# Patient Record
Sex: MALE | Race: WHITE | NOT HISPANIC OR LATINO | URBAN - METROPOLITAN AREA
[De-identification: names, ages, dates, MRNs, and addresses within clinical notes are randomized per-mention and may not be internally consistent; named-entity substitution may affect disease eponyms.]

---

## 2018-04-20 ENCOUNTER — EMERGENCY (EMERGENCY)
Facility: HOSPITAL | Age: 73
LOS: 1 days | Discharge: HOME | End: 2018-04-20
Attending: EMERGENCY MEDICINE
Payer: MEDICARE

## 2018-04-20 VITALS
OXYGEN SATURATION: 98 % | HEART RATE: 68 BPM | RESPIRATION RATE: 16 BRPM | DIASTOLIC BLOOD PRESSURE: 93 MMHG | SYSTOLIC BLOOD PRESSURE: 143 MMHG

## 2018-04-20 VITALS
HEART RATE: 73 BPM | SYSTOLIC BLOOD PRESSURE: 153 MMHG | DIASTOLIC BLOOD PRESSURE: 92 MMHG | RESPIRATION RATE: 18 BRPM | WEIGHT: 205.91 LBS | OXYGEN SATURATION: 97 % | TEMPERATURE: 98 F

## 2018-04-20 LAB
ALBUMIN SERPL ELPH-MCNC: 4.5 G/DL — SIGNIFICANT CHANGE UP (ref 3.3–5)
ALP SERPL-CCNC: 90 U/L — SIGNIFICANT CHANGE UP (ref 40–120)
ALT FLD-CCNC: 21 U/L — SIGNIFICANT CHANGE UP (ref 10–45)
ANION GAP SERPL CALC-SCNC: 17 MMOL/L — SIGNIFICANT CHANGE UP (ref 5–17)
AST SERPL-CCNC: 33 U/L — SIGNIFICANT CHANGE UP (ref 10–40)
BASOPHILS # BLD AUTO: 0.1 K/UL — SIGNIFICANT CHANGE UP (ref 0–0.2)
BASOPHILS NFR BLD AUTO: 1 % — SIGNIFICANT CHANGE UP (ref 0–2)
BILIRUB SERPL-MCNC: 0.2 MG/DL — SIGNIFICANT CHANGE UP (ref 0.2–1.2)
BUN SERPL-MCNC: 13 MG/DL — SIGNIFICANT CHANGE UP (ref 7–23)
CALCIUM SERPL-MCNC: 10 MG/DL — SIGNIFICANT CHANGE UP (ref 8.4–10.5)
CHLORIDE SERPL-SCNC: 97 MMOL/L — SIGNIFICANT CHANGE UP (ref 96–108)
CO2 SERPL-SCNC: 25 MMOL/L — SIGNIFICANT CHANGE UP (ref 22–31)
CREAT SERPL-MCNC: 0.82 MG/DL — SIGNIFICANT CHANGE UP (ref 0.5–1.3)
EOSINOPHIL # BLD AUTO: 0.4 K/UL — SIGNIFICANT CHANGE UP (ref 0–0.5)
EOSINOPHIL NFR BLD AUTO: 4.9 % — SIGNIFICANT CHANGE UP (ref 0–6)
GLUCOSE SERPL-MCNC: 102 MG/DL — HIGH (ref 70–99)
HCT VFR BLD CALC: 41.7 % — SIGNIFICANT CHANGE UP (ref 39–50)
HGB BLD-MCNC: 14 G/DL — SIGNIFICANT CHANGE UP (ref 13–17)
LYMPHOCYTES # BLD AUTO: 2 K/UL — SIGNIFICANT CHANGE UP (ref 1–3.3)
LYMPHOCYTES # BLD AUTO: 23.1 % — SIGNIFICANT CHANGE UP (ref 13–44)
MCHC RBC-ENTMCNC: 30.8 PG — SIGNIFICANT CHANGE UP (ref 27–34)
MCHC RBC-ENTMCNC: 33.5 GM/DL — SIGNIFICANT CHANGE UP (ref 32–36)
MCV RBC AUTO: 91.8 FL — SIGNIFICANT CHANGE UP (ref 80–100)
MONOCYTES # BLD AUTO: 0.7 K/UL — SIGNIFICANT CHANGE UP (ref 0–0.9)
MONOCYTES NFR BLD AUTO: 7.6 % — SIGNIFICANT CHANGE UP (ref 2–14)
NEUTROPHILS # BLD AUTO: 5.4 K/UL — SIGNIFICANT CHANGE UP (ref 1.8–7.4)
NEUTROPHILS NFR BLD AUTO: 63.3 % — SIGNIFICANT CHANGE UP (ref 43–77)
PLATELET # BLD AUTO: 332 K/UL — SIGNIFICANT CHANGE UP (ref 150–400)
POTASSIUM SERPL-MCNC: 3.7 MMOL/L — SIGNIFICANT CHANGE UP (ref 3.5–5.3)
POTASSIUM SERPL-SCNC: 3.7 MMOL/L — SIGNIFICANT CHANGE UP (ref 3.5–5.3)
PROT SERPL-MCNC: 8.1 G/DL — SIGNIFICANT CHANGE UP (ref 6–8.3)
RBC # BLD: 4.54 M/UL — SIGNIFICANT CHANGE UP (ref 4.2–5.8)
RBC # FLD: 12.5 % — SIGNIFICANT CHANGE UP (ref 10.3–14.5)
SODIUM SERPL-SCNC: 139 MMOL/L — SIGNIFICANT CHANGE UP (ref 135–145)
WBC # BLD: 8.6 K/UL — SIGNIFICANT CHANGE UP (ref 3.8–10.5)
WBC # FLD AUTO: 8.6 K/UL — SIGNIFICANT CHANGE UP (ref 3.8–10.5)

## 2018-04-20 PROCEDURE — 73701 CT LOWER EXTREMITY W/DYE: CPT

## 2018-04-20 PROCEDURE — 73590 X-RAY EXAM OF LOWER LEG: CPT

## 2018-04-20 PROCEDURE — 73701 CT LOWER EXTREMITY W/DYE: CPT | Mod: 26,RT

## 2018-04-20 PROCEDURE — 80053 COMPREHEN METABOLIC PANEL: CPT

## 2018-04-20 PROCEDURE — 73590 X-RAY EXAM OF LOWER LEG: CPT | Mod: 26,RT

## 2018-04-20 PROCEDURE — 10060 I&D ABSCESS SIMPLE/SINGLE: CPT

## 2018-04-20 PROCEDURE — 99284 EMERGENCY DEPT VISIT MOD MDM: CPT | Mod: 25

## 2018-04-20 PROCEDURE — 93971 EXTREMITY STUDY: CPT | Mod: 26

## 2018-04-20 PROCEDURE — 93971 EXTREMITY STUDY: CPT

## 2018-04-20 PROCEDURE — 85027 COMPLETE CBC AUTOMATED: CPT

## 2018-04-20 NOTE — ED ADULT NURSE REASSESSMENT NOTE - NS ED NURSE REASSESS COMMENT FT1
Care assumed of pt at this time. Wife at bedside. Pt NAD. Updated to plan of care, pt waiting for US results.

## 2018-04-20 NOTE — ED ADULT TRIAGE NOTE - CHIEF COMPLAINT QUOTE
Rt leg swelling/ redness, sent by MD r/o cellulitis.  Recent travel to [place] and [place], denies cough, fever, rash

## 2018-04-20 NOTE — ED PROVIDER NOTE - PLAN OF CARE
You need to follow up with your doctor to make sure that the incision is healing well. Leave the wound open so that it can drain, you can keep it covered with gauze as I showed you. If you have any increase in pain, swelling or redness come right back to the ED. We are sending a different antibiotic to the pharmacy for you to use.

## 2018-04-20 NOTE — ED PROVIDER NOTE - PHYSICAL EXAMINATION
right lower extremity: significant area of erythema to anterior surface of leg, area of induration, no fluctuance, no crepitus, pain to palpation over area of erythema, warm. Normal pedal pulse. Full ROM at ankle, knee. No calf tenderness. Mild/Moderate circumferential calf swelling noted.

## 2018-04-20 NOTE — ED PROVIDER NOTE - CARE PLAN
Principal Discharge DX:	Abscess  Assessment and plan of treatment:	You need to follow up with your doctor to make sure that the incision is healing well. Leave the wound open so that it can drain, you can keep it covered with gauze as I showed you. If you have any increase in pain, swelling or redness come right back to the ED. We are sending a different antibiotic to the pharmacy for you to use. Principal Discharge DX:	Cellulitis and abscess  Assessment and plan of treatment:	You need to follow up with your doctor to make sure that the incision is healing well. Leave the wound open so that it can drain, you can keep it covered with gauze as I showed you. If you have any increase in pain, swelling or redness come right back to the ED. We are sending a different antibiotic to the pharmacy for you to use.

## 2018-04-20 NOTE — ED PROVIDER NOTE - ATTENDING CONTRIBUTION TO CARE
72 y/o male with the above documented history and HPI who on exam appears very well and comfortable. The skin of his anterolateral distal RLE does have an area of erythema with a near central area of induration that is not fluctuant and edema that is however more circumferential than focal with the distal RLE larger than the distal LLE. There is some ttp in the same location as is the erythema without palpable cord or soft tissue crepitus. To me, the area is no more warm than any other location. In the midst of a course of narrow spectrum ABX, we have obtained some basic screening studies in an effort to assess for more serious/complicated issue including an US to r/o DVT. If his screening studies are unremarkable, I anticipate DC on a more broad spectrum ABX. With nothing clinically evident to suggest any more serious infection or complicating issue such as vascular compromise, compartment syndrome, nec fasc, etc.

## 2018-04-20 NOTE — ED PROVIDER NOTE - MEDICAL DECISION MAKING DETAILS
73M with right lower extremity anterior skin changes, swelling, erythema, pain. Concern for cellulitis. Will obtain basic labs, US to r/o DVT, xray to r/o foreign body or free air. Currently stable, no acute distress. Will continue to follow up and re-assess. Case discussed with Attending: Yomaira Valiente MD, PGY1

## 2018-04-20 NOTE — ED ADULT NURSE NOTE - OBJECTIVE STATEMENT
c/o redness, swelling and pain to lower right leg which started about 2 weeks ago when he struck his leg against a bed. Large red and swollen lump noted on right lower leg. Patient recently traveled overseas and states he did a lot of walking while his leg was reddened. Reports he was started on an antibiotic and has been taking it for 5 days now. States his leg was previously much more swollen and red but started improving. States lately the redness and swelling started to become worse again. Patient reports that while he was overseas, a physician drained fluid from the area and informed him there was no pus and only blood. Denies any fever or chills, nausea, vomiting or diarrhea. Reports he is able to ambulate with no difficulty.

## 2018-04-20 NOTE — ED PROVIDER NOTE - OBJECTIVE STATEMENT
73M, hx COPD, presents from PCP office due to swelling and pain of right leg. Per patient, hit leg into a bed frame a few 73M, hx COPD, presents from PCP office due to swelling and pain of right leg. Per patient, hit leg into a bed frame a few weeks ago when on vacation. Patient then began to develop pain and swelling to anterior surface of right lower extremity. While in [place] earlier this week, was seen by doctor who aspirated fluid from area on leg (?) and began patient on lucía antibiotic flucloxacillin. Patient saw PMD today who sent to ED for eval for cellulitis. Patient denies any other symptoms: no fevers or chills, nausea or vomiting, headache, weakness, abdominal pain, numbness in leg. Reports swelling of leg which was worse over last few days but decreased today. Allergic to latex. 73M, hx COPD, presents from PCP office due to swelling and pain of right leg. Per patient, hit leg into a bed frame a few weeks ago when on vacation. Patient then began to develop pain and swelling to anterior surface of right lower extremity. While in [place] earlier this week, was seen by doctor who aspirated fluid from area on leg (?) and began patient on oral antibiotic flucloxacillin. Patient saw PMD today who sent to ED for eval for cellulitis. Patient denies any other symptoms: no fevers or chills, nausea or vomiting, headache, weakness, abdominal pain, numbness in leg. Reports swelling of leg which was worse over last few days but decreased today. Allergic to latex.

## 2018-04-21 RX ORDER — AZTREONAM 2 G
1 VIAL (EA) INJECTION
Qty: 20 | Refills: 0
Start: 2018-04-21 | End: 2018-04-30

## 2018-04-24 NOTE — ED POST DISCHARGE NOTE - OTHER COMMUNICATION
4/24/18 approx 4PM: Patient called inquiring about discharge diagnosis as he was not discharged with paperwork or results. Made pt aware his diagnosis was cellulitis and patient stated he has been taking Bactrim prescribed to him by Dr. Burnette with minimal improvement. Patient continues to have swelling and erythema. Patient on day 4/10 of antibiotics. Discussed with patient to consider returning to ED to be re-evaluated and possibly IV antibiotics since he has not had much improvement. Informed pt if he develops fevers or spreading of cellulitis to return to ED immediately. Patient understood above recommendations and faxed lab results with discharge paperwork to PCP [facility] [phone]-Michelle Del Valle PA-C

## 2018-05-21 ENCOUNTER — EMERGENCY (EMERGENCY)
Facility: HOSPITAL | Age: 73
LOS: 1 days | Discharge: HOME | End: 2018-05-21
Attending: EMERGENCY MEDICINE
Payer: MEDICARE

## 2018-05-21 VITALS
DIASTOLIC BLOOD PRESSURE: 100 MMHG | RESPIRATION RATE: 16 BRPM | TEMPERATURE: 98 F | SYSTOLIC BLOOD PRESSURE: 149 MMHG | WEIGHT: 201.94 LBS | OXYGEN SATURATION: 97 % | HEART RATE: 66 BPM

## 2018-05-21 PROCEDURE — 30901 CONTROL OF NOSEBLEED: CPT

## 2018-05-21 PROCEDURE — 99282 EMERGENCY DEPT VISIT SF MDM: CPT | Mod: 25

## 2018-05-21 PROCEDURE — 30901 CONTROL OF NOSEBLEED: CPT | Mod: LT

## 2018-05-21 PROCEDURE — 99283 EMERGENCY DEPT VISIT LOW MDM: CPT | Mod: 25

## 2018-05-21 RX ORDER — OXYMETAZOLINE HYDROCHLORIDE 0.5 MG/ML
2 SPRAY NASAL ONCE
Qty: 0 | Refills: 0 | Status: COMPLETED | OUTPATIENT
Start: 2018-05-21 | End: 2018-05-21

## 2018-05-21 RX ORDER — TRANEXAMIC ACID 100 MG/ML
1 INJECTION, SOLUTION INTRAVENOUS ONCE
Qty: 0 | Refills: 0 | Status: COMPLETED | OUTPATIENT
Start: 2018-05-21 | End: 2018-05-21

## 2018-05-21 RX ADMIN — OXYMETAZOLINE HYDROCHLORIDE 2 SPRAY(S): 0.5 SPRAY NASAL at 05:00

## 2018-05-21 RX ADMIN — TRANEXAMIC ACID 1 APPLICATION(S): 100 INJECTION, SOLUTION INTRAVENOUS at 05:11

## 2018-05-21 NOTE — ED PROVIDER NOTE - ENMT, MLM
Airway patent, Nasal mucosa clear, small area of mucosal injection over anterior L nasal septum. Mouth with normal mucosa. Throat has no vesicles, no oropharyngeal exudates and uvula is midline. Airway patent, Nasal mucosa clear, small area of mucosal injection over anterior L nasal septum-no active bleeding. Mouth with normal mucosa. Throat has no vesicles, no oropharyngeal exudates and uvula is midline.

## 2018-05-21 NOTE — ED PROVIDER NOTE - OBJECTIVE STATEMENT
72yo M with HTN/hypothyroidism presents with nosebleed. Reports acute onset immediately before arrival. Reports he woke up with nosebleed. Reports recent rhinorrhea and frequent nose blowing. Reports he takes ASA but no other AC. Reports frequent nosebleeds when young.

## 2018-05-21 NOTE — ED PROCEDURE NOTE - CPROC ED NASAL DETAIL1
The source of the bleeding was clearly identified./The area was cauterized with silver nitrate./topical TXA

## 2018-05-21 NOTE — ED PROVIDER NOTE - MEDICAL DECISION MAKING DETAILS
74yo M presents with epistaxis. On exam no active bleeding, will cauterize mucosal defect. Will observe for recurrence.

## 2018-10-19 ENCOUNTER — EMERGENCY (EMERGENCY)
Facility: HOSPITAL | Age: 73
LOS: 1 days | Discharge: HOME | End: 2018-10-19
Attending: EMERGENCY MEDICINE
Payer: MEDICARE

## 2018-10-19 VITALS
HEART RATE: 63 BPM | DIASTOLIC BLOOD PRESSURE: 89 MMHG | SYSTOLIC BLOOD PRESSURE: 147 MMHG | RESPIRATION RATE: 16 BRPM | TEMPERATURE: 98 F | OXYGEN SATURATION: 96 %

## 2018-10-19 VITALS
HEIGHT: 67 IN | HEART RATE: 81 BPM | OXYGEN SATURATION: 97 % | DIASTOLIC BLOOD PRESSURE: 100 MMHG | TEMPERATURE: 98 F | SYSTOLIC BLOOD PRESSURE: 158 MMHG | WEIGHT: 205.03 LBS | RESPIRATION RATE: 20 BRPM

## 2018-10-19 LAB
ALBUMIN SERPL ELPH-MCNC: 4.3 G/DL — SIGNIFICANT CHANGE UP (ref 3.3–5)
ALP SERPL-CCNC: 94 U/L — SIGNIFICANT CHANGE UP (ref 40–120)
ALT FLD-CCNC: 12 U/L — SIGNIFICANT CHANGE UP (ref 10–45)
ANION GAP SERPL CALC-SCNC: 13 MMOL/L — SIGNIFICANT CHANGE UP (ref 5–17)
AST SERPL-CCNC: 17 U/L — SIGNIFICANT CHANGE UP (ref 10–40)
BASE EXCESS BLDV CALC-SCNC: 4.1 MMOL/L — HIGH (ref -2–2)
BASOPHILS # BLD AUTO: 0 K/UL — SIGNIFICANT CHANGE UP (ref 0–0.2)
BASOPHILS NFR BLD AUTO: 0.7 % — SIGNIFICANT CHANGE UP (ref 0–2)
BILIRUB SERPL-MCNC: 0.2 MG/DL — SIGNIFICANT CHANGE UP (ref 0.2–1.2)
BUN SERPL-MCNC: 14 MG/DL — SIGNIFICANT CHANGE UP (ref 7–23)
CA-I SERPL-SCNC: 1.21 MMOL/L — SIGNIFICANT CHANGE UP (ref 1.12–1.3)
CALCIUM SERPL-MCNC: 9.9 MG/DL — SIGNIFICANT CHANGE UP (ref 8.4–10.5)
CHLORIDE BLDV-SCNC: 102 MMOL/L — SIGNIFICANT CHANGE UP (ref 96–108)
CHLORIDE SERPL-SCNC: 100 MMOL/L — SIGNIFICANT CHANGE UP (ref 96–108)
CO2 BLDV-SCNC: 31 MMOL/L — HIGH (ref 22–30)
CO2 SERPL-SCNC: 26 MMOL/L — SIGNIFICANT CHANGE UP (ref 22–31)
CREAT SERPL-MCNC: 0.7 MG/DL — SIGNIFICANT CHANGE UP (ref 0.5–1.3)
D DIMER BLD IA.RAPID-MCNC: 290 NG/ML DDU — HIGH
EOSINOPHIL # BLD AUTO: 0.1 K/UL — SIGNIFICANT CHANGE UP (ref 0–0.5)
EOSINOPHIL NFR BLD AUTO: 1.8 % — SIGNIFICANT CHANGE UP (ref 0–6)
GAS PNL BLDV: 137 MMOL/L — SIGNIFICANT CHANGE UP (ref 136–145)
GAS PNL BLDV: SIGNIFICANT CHANGE UP
GLUCOSE BLDV-MCNC: 90 MG/DL — SIGNIFICANT CHANGE UP (ref 70–99)
GLUCOSE SERPL-MCNC: 96 MG/DL — SIGNIFICANT CHANGE UP (ref 70–99)
HCO3 BLDV-SCNC: 29 MMOL/L — SIGNIFICANT CHANGE UP (ref 21–29)
HCT VFR BLD CALC: 40.9 % — SIGNIFICANT CHANGE UP (ref 39–50)
HCT VFR BLDA CALC: 41 % — SIGNIFICANT CHANGE UP (ref 39–50)
HGB BLD CALC-MCNC: 13.4 G/DL — SIGNIFICANT CHANGE UP (ref 13–17)
HGB BLD-MCNC: 13.1 G/DL — SIGNIFICANT CHANGE UP (ref 13–17)
LACTATE BLDV-MCNC: 1.5 MMOL/L — SIGNIFICANT CHANGE UP (ref 0.7–2)
LYMPHOCYTES # BLD AUTO: 2.3 K/UL — SIGNIFICANT CHANGE UP (ref 1–3.3)
LYMPHOCYTES # BLD AUTO: 34.3 % — SIGNIFICANT CHANGE UP (ref 13–44)
MCHC RBC-ENTMCNC: 25.6 PG — LOW (ref 27–34)
MCHC RBC-ENTMCNC: 32.1 GM/DL — SIGNIFICANT CHANGE UP (ref 32–36)
MCV RBC AUTO: 79.7 FL — LOW (ref 80–100)
MONOCYTES # BLD AUTO: 0.7 K/UL — SIGNIFICANT CHANGE UP (ref 0–0.9)
MONOCYTES NFR BLD AUTO: 10.1 % — SIGNIFICANT CHANGE UP (ref 2–14)
NEUTROPHILS # BLD AUTO: 3.6 K/UL — SIGNIFICANT CHANGE UP (ref 1.8–7.4)
NEUTROPHILS NFR BLD AUTO: 53 % — SIGNIFICANT CHANGE UP (ref 43–77)
NT-PROBNP SERPL-SCNC: 25 PG/ML — SIGNIFICANT CHANGE UP (ref 0–300)
OTHER CELLS CSF MANUAL: 11 ML/DL — LOW (ref 18–22)
PCO2 BLDV: 49 MMHG — SIGNIFICANT CHANGE UP (ref 35–50)
PH BLDV: 7.39 — SIGNIFICANT CHANGE UP (ref 7.35–7.45)
PLATELET # BLD AUTO: 259 K/UL — SIGNIFICANT CHANGE UP (ref 150–400)
PO2 BLDV: 36 MMHG — SIGNIFICANT CHANGE UP (ref 25–45)
POTASSIUM BLDV-SCNC: 3.6 MMOL/L — SIGNIFICANT CHANGE UP (ref 3.5–5.3)
POTASSIUM SERPL-MCNC: 3.8 MMOL/L — SIGNIFICANT CHANGE UP (ref 3.5–5.3)
POTASSIUM SERPL-SCNC: 3.8 MMOL/L — SIGNIFICANT CHANGE UP (ref 3.5–5.3)
PROT SERPL-MCNC: 7.9 G/DL — SIGNIFICANT CHANGE UP (ref 6–8.3)
RBC # BLD: 5.13 M/UL — SIGNIFICANT CHANGE UP (ref 4.2–5.8)
RBC # FLD: 17.2 % — HIGH (ref 10.3–14.5)
SAO2 % BLDV: 62 % — LOW (ref 67–88)
SODIUM SERPL-SCNC: 139 MMOL/L — SIGNIFICANT CHANGE UP (ref 135–145)
TROPONIN T, HIGH SENSITIVITY RESULT: 8 NG/L — SIGNIFICANT CHANGE UP (ref 0–51)
WBC # BLD: 6.8 K/UL — SIGNIFICANT CHANGE UP (ref 3.8–10.5)
WBC # FLD AUTO: 6.8 K/UL — SIGNIFICANT CHANGE UP (ref 3.8–10.5)

## 2018-10-19 PROCEDURE — 83880 ASSAY OF NATRIURETIC PEPTIDE: CPT

## 2018-10-19 PROCEDURE — 84295 ASSAY OF SERUM SODIUM: CPT

## 2018-10-19 PROCEDURE — 82435 ASSAY OF BLOOD CHLORIDE: CPT

## 2018-10-19 PROCEDURE — 93971 EXTREMITY STUDY: CPT | Mod: 26

## 2018-10-19 PROCEDURE — 84484 ASSAY OF TROPONIN QUANT: CPT

## 2018-10-19 PROCEDURE — 85610 PROTHROMBIN TIME: CPT

## 2018-10-19 PROCEDURE — 71275 CT ANGIOGRAPHY CHEST: CPT | Mod: 26

## 2018-10-19 PROCEDURE — 82803 BLOOD GASES ANY COMBINATION: CPT

## 2018-10-19 PROCEDURE — 93308 TTE F-UP OR LMTD: CPT

## 2018-10-19 PROCEDURE — 71275 CT ANGIOGRAPHY CHEST: CPT

## 2018-10-19 PROCEDURE — 93971 EXTREMITY STUDY: CPT

## 2018-10-19 PROCEDURE — 99284 EMERGENCY DEPT VISIT MOD MDM: CPT | Mod: 25

## 2018-10-19 PROCEDURE — 83605 ASSAY OF LACTIC ACID: CPT

## 2018-10-19 PROCEDURE — 85027 COMPLETE CBC AUTOMATED: CPT

## 2018-10-19 PROCEDURE — 85730 THROMBOPLASTIN TIME PARTIAL: CPT

## 2018-10-19 PROCEDURE — 93005 ELECTROCARDIOGRAM TRACING: CPT

## 2018-10-19 PROCEDURE — 85379 FIBRIN DEGRADATION QUANT: CPT

## 2018-10-19 PROCEDURE — 82330 ASSAY OF CALCIUM: CPT

## 2018-10-19 PROCEDURE — 80053 COMPREHEN METABOLIC PANEL: CPT

## 2018-10-19 PROCEDURE — 93308 TTE F-UP OR LMTD: CPT | Mod: 26

## 2018-10-19 PROCEDURE — 84132 ASSAY OF SERUM POTASSIUM: CPT

## 2018-10-19 PROCEDURE — 85014 HEMATOCRIT: CPT

## 2018-10-19 PROCEDURE — 99284 EMERGENCY DEPT VISIT MOD MDM: CPT

## 2018-10-19 PROCEDURE — 82947 ASSAY GLUCOSE BLOOD QUANT: CPT

## 2018-10-19 RX ORDER — SODIUM CHLORIDE 9 MG/ML
1000 INJECTION INTRAMUSCULAR; INTRAVENOUS; SUBCUTANEOUS ONCE
Qty: 0 | Refills: 0 | Status: COMPLETED | OUTPATIENT
Start: 2018-10-19 | End: 2018-10-19

## 2018-10-19 RX ADMIN — SODIUM CHLORIDE 500 MILLILITER(S): 9 INJECTION INTRAMUSCULAR; INTRAVENOUS; SUBCUTANEOUS at 21:47

## 2018-10-19 NOTE — ED PROVIDER NOTE - PHYSICAL EXAMINATION
Attending Rosa Morton: Gen: NAD, heent: atrauamtic, eomi, perrla, mmm, op pink, uvula midline, neck; nttp, no nuchal rigidity, chest: nttp, no crepitus, cv: rrr, no murmurs, lungs: ctab, abd: soft, nontender, nondistended, no peritoneal signs, +BS, no guarding, ext: wwp, RLE swelling, negative jah, skin: no rash, neuro: awake and alert, following commands, speech clear, sensation and strength intact, no focal deficits

## 2018-10-19 NOTE — ED ADULT TRIAGE NOTE - CHIEF COMPLAINT QUOTE
pt had a DVT and IVC filter placed- removed in August and stopped Xarelto at that time. pt now c/o difficulty breathing and chest pressure x 10 days  sent by md for CTA to r/o PE  pt had normal EKG and stress test one week ago

## 2018-10-19 NOTE — ED CLERICAL - NS ED CLERK NOTE PRE-ARRIVAL INFORMATION; ADDITIONAL PRE-ARRIVAL INFORMATION
CC/Reason For referral: R/o PE  Preferred Consultant(if applicable): [facility]  Who admits for you (if needed):  Do you have documents you would like to fax over?  Would you still like to speak to an ED attending?

## 2018-10-19 NOTE — ED PROVIDER NOTE - PROGRESS NOTE DETAILS
Attending Rosa Morton: pt comfortable. CTA showed no evidence of pE or acute pulmonary pathology. pt had recent stress test which was reportedly normal. venous duplex showed chronic appearing dvt. pt's pcp paged. offered admission for further evaluation however pt prefers to follow up with his doctors. pt understands to return immediately for any worsening sob or difficulty breathing. Attending Rosa Morton: pt ambualting without any difficulty or complaints of sob

## 2018-10-19 NOTE — ED PROVIDER NOTE - OBJECTIVE STATEMENT
Attending Rosa Morton: 72 y/o male h/o HTN with h/o prior dvt after trauma to right leg not currently on anticoagulation as cleared presenting with worsening sob for last 2 weeks. had stress test last week which was reportedly normal. pt states is having worsening sob for last week. no fevers or chills. does report a nonproductive cough. no pain with inspiration. also noticed swelling to Right lower extremity. denies any pain to leg. no numbness or tingling. does have a h/o mild copd and uses inhalers as needed

## 2018-10-19 NOTE — ED PROVIDER NOTE - MEDICAL DECISION MAKING DETAILS
Attending Rosa Morton: 74 y/o male with multiple medical issues including COPD, with prior dvt with previous IVC filter not currently on anticoagulation presenting with sob. on exam pt with swelling to the right lower extremity. pocus shows chronic appearing dvt without evidence of acute process. pt with recent reported negative nuclear stress test making cardiac process less likely. will obtain labs, CTA chest and re-eval

## 2018-10-20 PROBLEM — I10 ESSENTIAL (PRIMARY) HYPERTENSION: Chronic | Status: ACTIVE | Noted: 2018-05-21

## 2018-10-20 PROBLEM — E03.9 HYPOTHYROIDISM, UNSPECIFIED: Chronic | Status: ACTIVE | Noted: 2018-05-21

## 2018-12-12 ENCOUNTER — RESULT REVIEW (OUTPATIENT)
Age: 73
End: 2018-12-12

## 2020-03-28 ENCOUNTER — EMERGENCY (EMERGENCY)
Facility: HOSPITAL | Age: 75
LOS: 1 days | Discharge: HOME | End: 2020-03-28
Attending: EMERGENCY MEDICINE
Payer: MEDICARE

## 2020-03-28 VITALS
HEART RATE: 81 BPM | TEMPERATURE: 98 F | SYSTOLIC BLOOD PRESSURE: 167 MMHG | RESPIRATION RATE: 18 BRPM | HEIGHT: 67 IN | WEIGHT: 205.91 LBS | OXYGEN SATURATION: 97 % | DIASTOLIC BLOOD PRESSURE: 98 MMHG

## 2020-03-28 PROCEDURE — 99285 EMERGENCY DEPT VISIT HI MDM: CPT

## 2020-03-28 RX ORDER — ASPIRIN/CALCIUM CARB/MAGNESIUM 324 MG
162 TABLET ORAL ONCE
Refills: 0 | Status: COMPLETED | OUTPATIENT
Start: 2020-03-28 | End: 2020-03-28

## 2020-03-28 RX ADMIN — Medication 162 MILLIGRAM(S): at 23:59

## 2020-03-29 VITALS
RESPIRATION RATE: 18 BRPM | TEMPERATURE: 98 F | HEART RATE: 60 BPM | DIASTOLIC BLOOD PRESSURE: 93 MMHG | SYSTOLIC BLOOD PRESSURE: 135 MMHG | OXYGEN SATURATION: 95 %

## 2020-03-29 LAB
ALBUMIN SERPL ELPH-MCNC: 4.5 G/DL — SIGNIFICANT CHANGE UP (ref 3.3–5)
ALP SERPL-CCNC: 87 U/L — SIGNIFICANT CHANGE UP (ref 40–120)
ALT FLD-CCNC: 17 U/L — SIGNIFICANT CHANGE UP (ref 10–45)
ANION GAP SERPL CALC-SCNC: 14 MMOL/L — SIGNIFICANT CHANGE UP (ref 5–17)
AST SERPL-CCNC: 21 U/L — SIGNIFICANT CHANGE UP (ref 10–40)
BASE EXCESS BLDV CALC-SCNC: 3.4 MMOL/L — HIGH (ref -2–2)
BILIRUB SERPL-MCNC: 0.2 MG/DL — SIGNIFICANT CHANGE UP (ref 0.2–1.2)
BUN SERPL-MCNC: 14 MG/DL — SIGNIFICANT CHANGE UP (ref 7–23)
CA-I SERPL-SCNC: 1.21 MMOL/L — SIGNIFICANT CHANGE UP (ref 1.12–1.3)
CALCIUM SERPL-MCNC: 10.1 MG/DL — SIGNIFICANT CHANGE UP (ref 8.4–10.5)
CHLORIDE BLDV-SCNC: 103 MMOL/L — SIGNIFICANT CHANGE UP (ref 96–108)
CHLORIDE SERPL-SCNC: 98 MMOL/L — SIGNIFICANT CHANGE UP (ref 96–108)
CO2 BLDV-SCNC: 31 MMOL/L — HIGH (ref 22–30)
CO2 SERPL-SCNC: 25 MMOL/L — SIGNIFICANT CHANGE UP (ref 22–31)
CREAT SERPL-MCNC: 0.9 MG/DL — SIGNIFICANT CHANGE UP (ref 0.5–1.3)
GAS PNL BLDV: 136 MMOL/L — SIGNIFICANT CHANGE UP (ref 135–145)
GAS PNL BLDV: SIGNIFICANT CHANGE UP
GAS PNL BLDV: SIGNIFICANT CHANGE UP
GLUCOSE BLDV-MCNC: 108 MG/DL — HIGH (ref 70–99)
GLUCOSE SERPL-MCNC: 114 MG/DL — HIGH (ref 70–99)
HCO3 BLDV-SCNC: 29 MMOL/L — SIGNIFICANT CHANGE UP (ref 21–29)
HCT VFR BLD CALC: 43.6 % — SIGNIFICANT CHANGE UP (ref 39–50)
HCT VFR BLDA CALC: 46 % — SIGNIFICANT CHANGE UP (ref 39–50)
HGB BLD CALC-MCNC: 15 G/DL — SIGNIFICANT CHANGE UP (ref 13–17)
HGB BLD-MCNC: 14.9 G/DL — SIGNIFICANT CHANGE UP (ref 13–17)
LACTATE BLDV-MCNC: 2 MMOL/L — SIGNIFICANT CHANGE UP (ref 0.7–2)
MCHC RBC-ENTMCNC: 30.3 PG — SIGNIFICANT CHANGE UP (ref 27–34)
MCHC RBC-ENTMCNC: 34.2 GM/DL — SIGNIFICANT CHANGE UP (ref 32–36)
MCV RBC AUTO: 88.6 FL — SIGNIFICANT CHANGE UP (ref 80–100)
NRBC # BLD: 0 /100 WBCS — SIGNIFICANT CHANGE UP (ref 0–0)
PCO2 BLDV: 50 MMHG — SIGNIFICANT CHANGE UP (ref 35–50)
PH BLDV: 7.38 — SIGNIFICANT CHANGE UP (ref 7.35–7.45)
PLATELET # BLD AUTO: 282 K/UL — SIGNIFICANT CHANGE UP (ref 150–400)
PO2 BLDV: 34 MMHG — SIGNIFICANT CHANGE UP (ref 25–45)
POTASSIUM BLDV-SCNC: 4.1 MMOL/L — SIGNIFICANT CHANGE UP (ref 3.5–5.3)
POTASSIUM SERPL-MCNC: 3.7 MMOL/L — SIGNIFICANT CHANGE UP (ref 3.5–5.3)
POTASSIUM SERPL-SCNC: 3.7 MMOL/L — SIGNIFICANT CHANGE UP (ref 3.5–5.3)
PROT SERPL-MCNC: 8.4 G/DL — HIGH (ref 6–8.3)
RBC # BLD: 4.92 M/UL — SIGNIFICANT CHANGE UP (ref 4.2–5.8)
RBC # FLD: 13.1 % — SIGNIFICANT CHANGE UP (ref 10.3–14.5)
SAO2 % BLDV: 56 % — LOW (ref 67–88)
SODIUM SERPL-SCNC: 137 MMOL/L — SIGNIFICANT CHANGE UP (ref 135–145)
TROPONIN T, HIGH SENSITIVITY RESULT: 11 NG/L — SIGNIFICANT CHANGE UP (ref 0–51)
TROPONIN T, HIGH SENSITIVITY RESULT: 11 NG/L — SIGNIFICANT CHANGE UP (ref 0–51)
WBC # BLD: 7.63 K/UL — SIGNIFICANT CHANGE UP (ref 3.8–10.5)
WBC # FLD AUTO: 7.63 K/UL — SIGNIFICANT CHANGE UP (ref 3.8–10.5)

## 2020-03-29 PROCEDURE — 84132 ASSAY OF SERUM POTASSIUM: CPT

## 2020-03-29 PROCEDURE — 80053 COMPREHEN METABOLIC PANEL: CPT

## 2020-03-29 PROCEDURE — 71046 X-RAY EXAM CHEST 2 VIEWS: CPT

## 2020-03-29 PROCEDURE — 82803 BLOOD GASES ANY COMBINATION: CPT

## 2020-03-29 PROCEDURE — 82330 ASSAY OF CALCIUM: CPT

## 2020-03-29 PROCEDURE — 36415 COLL VENOUS BLD VENIPUNCTURE: CPT

## 2020-03-29 PROCEDURE — 85014 HEMATOCRIT: CPT

## 2020-03-29 PROCEDURE — 93005 ELECTROCARDIOGRAM TRACING: CPT

## 2020-03-29 PROCEDURE — 83605 ASSAY OF LACTIC ACID: CPT

## 2020-03-29 PROCEDURE — 71046 X-RAY EXAM CHEST 2 VIEWS: CPT | Mod: 26

## 2020-03-29 PROCEDURE — 82947 ASSAY GLUCOSE BLOOD QUANT: CPT

## 2020-03-29 PROCEDURE — 82435 ASSAY OF BLOOD CHLORIDE: CPT

## 2020-03-29 PROCEDURE — 85027 COMPLETE CBC AUTOMATED: CPT

## 2020-03-29 PROCEDURE — 99284 EMERGENCY DEPT VISIT MOD MDM: CPT | Mod: 25

## 2020-03-29 PROCEDURE — 84295 ASSAY OF SERUM SODIUM: CPT

## 2020-03-29 PROCEDURE — 84484 ASSAY OF TROPONIN QUANT: CPT

## 2020-03-29 NOTE — ED PROVIDER NOTE - CLINICAL SUMMARY MEDICAL DECISION MAKING FREE TEXT BOX
Dr. Bledsoe (Attending Physician)  Pt. with htn, hld pw midsternal chest pressure since this am. No COVID symptoms. Will check cardiac enzymes, patient had stress 2 months ago that was normal. would prefer not to be admitted and to follow-up with his cardiologist if trop negative.

## 2020-03-29 NOTE — ED PROVIDER NOTE - NSFOLLOWUPINSTRUCTIONS_ED_ALL_ED_FT
1. Follow up with PMD or [facility] at [phone] in 24-48hrs.  2. Follow up with Dr. Vallejo Cardiology in 24-48hrs.  3. If patient develops worsening symptoms, chest pain, difficulty breathing, fever, or any other concern return to the ER.

## 2020-03-29 NOTE — ED ADULT NURSE NOTE - CHPI ED NUR SYMPTOMS NEG
no back pain/no chills/no dizziness/no congestion/no diaphoresis/no fever/no shortness of breath/no syncope/no nausea/no vomiting

## 2020-03-29 NOTE — ED ADULT NURSE NOTE - OBJECTIVE STATEMENT
76y/o M coming to the ED c/o of chest pressure. Pt states that starting this morning around 11am he began to have chest pressure in the sternal region that radiated to his upper abdomen. Pt states that pain is a 6/10 and is "pressure like." Pt denies any SOB/N/V/D. 76y/o M coming to the ED c/o of chest pressure. Pt states that starting this morning around 11am he began to have chest pressure in the sternal region that radiated to his upper abdomen. Pt states that pain is a 6/10 and is "pressure like." Pt denies any SOB/N/V/D. Pt denies sick contact, fever, cough. Pt states that @ home he took 2 ASA. On exam, LS clear B/L. IV placed. Labs collected and sent. Heart monitor placed, NSR. EKG completed.

## 2020-03-29 NOTE — ED PROVIDER NOTE - PATIENT PORTAL LINK FT
You can access the FollowMyHealth Patient Portal offered by [facility] by registering at the following website: [email or web address]. By joining [employer]’s FollowMyHealth portal, you will also be able to view your health information using other applications (apps) compatible with our system.

## 2020-03-29 NOTE — ED PROVIDER NOTE - PROGRESS NOTE DETAILS
Patient is reassessed, states feeling much better at this time. No episodes of chest pain in the ER. Discussed results and options regarding plan. Pt states he would prefer to go home and [facility]. Pt states he had a nuclear stress test 2 mos ago that was normal. Return precautions discussed. RGUJRAL

## 2020-07-14 ENCOUNTER — EMERGENCY (EMERGENCY)
Facility: HOSPITAL | Age: 75
LOS: 1 days | Discharge: HOME | End: 2020-07-14
Attending: EMERGENCY MEDICINE
Payer: MEDICARE

## 2020-07-14 VITALS
OXYGEN SATURATION: 100 % | RESPIRATION RATE: 16 BRPM | SYSTOLIC BLOOD PRESSURE: 141 MMHG | HEART RATE: 72 BPM | DIASTOLIC BLOOD PRESSURE: 100 MMHG

## 2020-07-14 VITALS — HEIGHT: 67 IN | WEIGHT: 175.05 LBS

## 2020-07-14 LAB
ALBUMIN SERPL ELPH-MCNC: 4.3 G/DL — SIGNIFICANT CHANGE UP (ref 3.3–5)
ALP SERPL-CCNC: 96 U/L — SIGNIFICANT CHANGE UP (ref 40–120)
ALT FLD-CCNC: 23 U/L — SIGNIFICANT CHANGE UP (ref 10–45)
ANION GAP SERPL CALC-SCNC: 13 MMOL/L — SIGNIFICANT CHANGE UP (ref 5–17)
AST SERPL-CCNC: 24 U/L — SIGNIFICANT CHANGE UP (ref 10–40)
BILIRUB SERPL-MCNC: 0.2 MG/DL — SIGNIFICANT CHANGE UP (ref 0.2–1.2)
BUN SERPL-MCNC: 12 MG/DL — SIGNIFICANT CHANGE UP (ref 7–23)
CALCIUM SERPL-MCNC: 10.1 MG/DL — SIGNIFICANT CHANGE UP (ref 8.4–10.5)
CHLORIDE SERPL-SCNC: 101 MMOL/L — SIGNIFICANT CHANGE UP (ref 96–108)
CO2 SERPL-SCNC: 24 MMOL/L — SIGNIFICANT CHANGE UP (ref 22–31)
CREAT SERPL-MCNC: 0.79 MG/DL — SIGNIFICANT CHANGE UP (ref 0.5–1.3)
D DIMER BLD IA.RAPID-MCNC: 312 NG/ML DDU — HIGH
GLUCOSE SERPL-MCNC: 106 MG/DL — HIGH (ref 70–99)
HCT VFR BLD CALC: 43.6 % — SIGNIFICANT CHANGE UP (ref 39–50)
HGB BLD-MCNC: 14.8 G/DL — SIGNIFICANT CHANGE UP (ref 13–17)
MCHC RBC-ENTMCNC: 29.9 PG — SIGNIFICANT CHANGE UP (ref 27–34)
MCHC RBC-ENTMCNC: 33.9 GM/DL — SIGNIFICANT CHANGE UP (ref 32–36)
MCV RBC AUTO: 88.1 FL — SIGNIFICANT CHANGE UP (ref 80–100)
NRBC # BLD: 0 /100 WBCS — SIGNIFICANT CHANGE UP (ref 0–0)
PLATELET # BLD AUTO: 262 K/UL — SIGNIFICANT CHANGE UP (ref 150–400)
POTASSIUM SERPL-MCNC: 3.6 MMOL/L — SIGNIFICANT CHANGE UP (ref 3.5–5.3)
POTASSIUM SERPL-SCNC: 3.6 MMOL/L — SIGNIFICANT CHANGE UP (ref 3.5–5.3)
PROT SERPL-MCNC: 7.2 G/DL — SIGNIFICANT CHANGE UP (ref 6–8.3)
RBC # BLD: 4.95 M/UL — SIGNIFICANT CHANGE UP (ref 4.2–5.8)
RBC # FLD: 13.1 % — SIGNIFICANT CHANGE UP (ref 10.3–14.5)
SARS-COV-2 RNA SPEC QL NAA+PROBE: SIGNIFICANT CHANGE UP
SODIUM SERPL-SCNC: 138 MMOL/L — SIGNIFICANT CHANGE UP (ref 135–145)
TROPONIN T, HIGH SENSITIVITY RESULT: 11 NG/L — SIGNIFICANT CHANGE UP (ref 0–51)
TROPONIN T, HIGH SENSITIVITY RESULT: 12 NG/L — SIGNIFICANT CHANGE UP (ref 0–51)
WBC # BLD: 6.24 K/UL — SIGNIFICANT CHANGE UP (ref 3.8–10.5)
WBC # FLD AUTO: 6.24 K/UL — SIGNIFICANT CHANGE UP (ref 3.8–10.5)

## 2020-07-14 PROCEDURE — 99284 EMERGENCY DEPT VISIT MOD MDM: CPT

## 2020-07-14 PROCEDURE — 99284 EMERGENCY DEPT VISIT MOD MDM: CPT | Mod: 25

## 2020-07-14 PROCEDURE — 93005 ELECTROCARDIOGRAM TRACING: CPT

## 2020-07-14 PROCEDURE — 84484 ASSAY OF TROPONIN QUANT: CPT

## 2020-07-14 PROCEDURE — 93010 ELECTROCARDIOGRAM REPORT: CPT

## 2020-07-14 PROCEDURE — 71275 CT ANGIOGRAPHY CHEST: CPT | Mod: 26

## 2020-07-14 PROCEDURE — 71275 CT ANGIOGRAPHY CHEST: CPT

## 2020-07-14 PROCEDURE — 85027 COMPLETE CBC AUTOMATED: CPT

## 2020-07-14 PROCEDURE — 71046 X-RAY EXAM CHEST 2 VIEWS: CPT

## 2020-07-14 PROCEDURE — 87635 SARS-COV-2 COVID-19 AMP PRB: CPT

## 2020-07-14 PROCEDURE — 85379 FIBRIN DEGRADATION QUANT: CPT

## 2020-07-14 PROCEDURE — 80053 COMPREHEN METABOLIC PANEL: CPT

## 2020-07-14 PROCEDURE — 71046 X-RAY EXAM CHEST 2 VIEWS: CPT | Mod: 26

## 2020-07-14 NOTE — ED PROVIDER NOTE - NS ED ROS FT
CONSTITUTIONAL: No fever/chills. No weight loss.   HEENT: No cough. No runny nose. No throat pain.   RESPIRATORY: No shortness of breath  CARDIOVASCULAR: + chest pain per HPI  GASTROINTESTINAL: No abdominal pain . No nausea/ vomiting. No diarrhea.   NEUROLOGICAL: No headache. No blurry vision.   SKIN: No rashes

## 2020-07-14 NOTE — ED ADULT NURSE NOTE - CHIEF COMPLAINT QUOTE
Patient c/o mid chest pain and right armpit pain for 2.5 hours. accompanied w/burping. History of acid reflex and COPD. Covid negative 10 days ago.

## 2020-07-14 NOTE — ED PROVIDER NOTE - NSFOLLOWUPINSTRUCTIONS_ED_ALL_ED_FT
YOU WERE SEEN IN THE ED FOR: CHEST PAIN    you were NOT found to have a blood clot in your lungs.    FOR PAIN/FEVER, YOU MAY TAKE TYLENOL. FOLLOW THE INSTRUCTIONS ON THE LABEL/CONTAINER.    PLEASE FOLLOW UP WITH YOUR CARDIOLOGIST WITHIN TH ENEXT 3 DAYS.    PLEASE FOLLOW UP WITH YOUR PRIVATE PHYSICIAN WITHIN THE NEXT 72 HOURS. BRING COPIES OF YOUR RESULTS.  -If you do not have a PMD, please call [phone] to find one convenient for you or call our clinic at [phone].    RETURN TO THE EMERGENCY DEPARTMENT IF YOU EXPERIENCE ANY NEW/CONCERNING/WORSENING SYMPTOMS.

## 2020-07-14 NOTE — ED ADULT NURSE NOTE - OBJECTIVE STATEMENT
Pt is a 76 y/o male c/o 3-4 hours of midsternal/ more towards right sided chest pain. States pain is sharp and constant. States he has not had pain like this before. Denies any other associated symtpoms. Denies SOB, N/V/D, numbness, tingling, cough, fever, chills, dizziness, weakness, headache. A&Ox3. Breathing unlabored and spontaneous. Abdomen soft, nontender, nondistended. Full ROM and strength of extremities. Safety and comfort measures provided.

## 2020-07-14 NOTE — ED PROVIDER NOTE - PATIENT PORTAL LINK FT
You can access the FollowMyHealth Patient Portal offered by [facility] by registering at the following website: [email or web address]. By joining [employer]’s FollowMyHealth portal, you will also be able to view your health information using other applications (apps) compatible with our system.

## 2020-07-14 NOTE — ED PROVIDER NOTE - CLINICAL SUMMARY MEDICAL DECISION MAKING FREE TEXT BOX
Differential: PE vs. ACS vs. pneumonia. Plan: EKG, CBC, CMP, D-dimer, CXR 76 yo M w/ PMH HTN, prior DVT after trauma to right leg not currently on anticoagulation presents with chest pain which worsens with deep inspiration x3-4 hours, no tachycardia, no hypoxia, no signs of DVT on exam.   Differential: PE vs. ACS vs. pneumonia. Plan: EKG, CBC, CMP, D-dimer, CXR.

## 2020-07-14 NOTE — ED PROVIDER NOTE - OBJECTIVE STATEMENT
74 yo M w/ PMH HTN, prior DVT after trauma to right leg not currently on anticoagulation presents with chest pain x3-4 hours.         He describes chest pain 76 yo M w/ PMH HTN, prior DVT after trauma to right leg not currently on anticoagulation presents with chest pain x3-4 hours.         R sided chest pain (substernal level), radiating to R arm pit and L chest, sharp stabbing pain, worsens with deep inspiration. Physical exertion does not affect the pain. 8 out of 10 in severity, constant for 3-4 hours. Patient took advil did not improve the pain. No nausea/vomiting. No diaphoresis. No shortness of breath. No leg swelling or leg pain. No fever/chills.

## 2020-07-14 NOTE — ED PROVIDER NOTE - PROGRESS NOTE DETAILS
Jonnie Moreland D.O., PGY2 (Resident)  CTA negative for PE. Patient as well as prior chart notes state patient had a stress test done in January 2020. Results are not available though. Attempted to reach Dr. Dirk Vallejo [phone]. Patient AAOx3, ambulating, w/o current pain. Patient endorses he will f/u with his Cardiologist within the next 3 days

## 2020-07-14 NOTE — ED ADULT NURSE NOTE - NSIMPLEMENTINTERV_GEN_ALL_ED
Implemented All Universal Safety Interventions:  [place] to call system. Call bell, personal items and telephone within reach. Instruct patient to call for assistance. Room bathroom lighting operational. Non-slip footwear when patient is off stretcher. Physically safe environment: no spills, clutter or unnecessary equipment. Stretcher in lowest position, wheels locked, appropriate side rails in place.

## 2020-07-14 NOTE — ED PROVIDER NOTE - ATTENDING CONTRIBUTION TO CARE
MD Tan:  patient seen and evaluated personally.   I agree with the History & Physical,  Impression & Plan other than what was detailed in my note.  MD Tan    76 y/o m hx of dvt s/p trauma, no longer on ac, ht, hld, ;resenting w/ cp going to r arm, no cough, hemoptysis, unclear if sob. afebrile vital stable, non toxic, well appearing, neg gutierres jah, heart/lung sounds cta ekg unremarkable. plan for acs workp, d dimer. if neg workup may need cdu vs admit given rf

## 2020-07-14 NOTE — ED ADULT TRIAGE NOTE - CHIEF COMPLAINT QUOTE
Patient c/o mid chest pain and right armpit pain for 2.5 hours. History of acid reflex and COPD. Covid negative 10 days ago. Patient c/o mid chest pain and right armpit pain for 2.5 hours. accompanied w/burping. History of acid reflex and COPD. Covid negative 10 days ago.

## 2020-08-28 ENCOUNTER — TRANSCRIPTION ENCOUNTER (OUTPATIENT)
Age: 75
End: 2020-08-28

## 2020-10-07 ENCOUNTER — TRANSCRIPTION ENCOUNTER (OUTPATIENT)
Age: 75
End: 2020-10-07

## 2020-10-19 NOTE — ED ADULT TRIAGE NOTE - SOURCE OF INFORMATION
How Severe Is Your Skin Lesion?: mild Has Your Skin Lesion Been Treated?: not been treated Is This A New Presentation, Or A Follow-Up?: Skin Lesion Patient

## 2020-12-01 ENCOUNTER — INPATIENT (INPATIENT)
Facility: HOSPITAL | Age: 75
LOS: 2 days | Discharge: HOME | DRG: 287 | End: 2020-12-04
Attending: INTERNAL MEDICINE | Admitting: INTERNAL MEDICINE
Payer: MEDICARE

## 2020-12-01 VITALS
SYSTOLIC BLOOD PRESSURE: 169 MMHG | RESPIRATION RATE: 18 BRPM | WEIGHT: 190.04 LBS | OXYGEN SATURATION: 95 % | TEMPERATURE: 98 F | HEIGHT: 67 IN | DIASTOLIC BLOOD PRESSURE: 93 MMHG | HEART RATE: 71 BPM

## 2020-12-01 LAB
ALBUMIN SERPL ELPH-MCNC: 4.2 G/DL — SIGNIFICANT CHANGE UP (ref 3.3–5)
ALP SERPL-CCNC: 91 U/L — SIGNIFICANT CHANGE UP (ref 40–120)
ALT FLD-CCNC: 24 U/L — SIGNIFICANT CHANGE UP (ref 10–45)
ANION GAP SERPL CALC-SCNC: 18 MMOL/L — HIGH (ref 5–17)
AST SERPL-CCNC: 47 U/L — HIGH (ref 10–40)
BASOPHILS # BLD AUTO: 0.06 K/UL — SIGNIFICANT CHANGE UP (ref 0–0.2)
BASOPHILS NFR BLD AUTO: 0.8 % — SIGNIFICANT CHANGE UP (ref 0–2)
BILIRUB SERPL-MCNC: 0.2 MG/DL — SIGNIFICANT CHANGE UP (ref 0.2–1.2)
BUN SERPL-MCNC: 10 MG/DL — SIGNIFICANT CHANGE UP (ref 7–23)
CALCIUM SERPL-MCNC: 9.7 MG/DL — SIGNIFICANT CHANGE UP (ref 8.4–10.5)
CHLORIDE SERPL-SCNC: 101 MMOL/L — SIGNIFICANT CHANGE UP (ref 96–108)
CO2 SERPL-SCNC: 22 MMOL/L — SIGNIFICANT CHANGE UP (ref 22–31)
CREAT SERPL-MCNC: 0.74 MG/DL — SIGNIFICANT CHANGE UP (ref 0.5–1.3)
EOSINOPHIL # BLD AUTO: 0.2 K/UL — SIGNIFICANT CHANGE UP (ref 0–0.5)
EOSINOPHIL NFR BLD AUTO: 2.8 % — SIGNIFICANT CHANGE UP (ref 0–6)
GLUCOSE SERPL-MCNC: 116 MG/DL — HIGH (ref 70–99)
HCT VFR BLD CALC: 43.8 % — SIGNIFICANT CHANGE UP (ref 39–50)
HGB BLD-MCNC: 14.5 G/DL — SIGNIFICANT CHANGE UP (ref 13–17)
IMM GRANULOCYTES NFR BLD AUTO: 0.3 % — SIGNIFICANT CHANGE UP (ref 0–1.5)
LIDOCAIN IGE QN: 39 U/L — SIGNIFICANT CHANGE UP (ref 7–60)
LYMPHOCYTES # BLD AUTO: 2.17 K/UL — SIGNIFICANT CHANGE UP (ref 1–3.3)
LYMPHOCYTES # BLD AUTO: 30.7 % — SIGNIFICANT CHANGE UP (ref 13–44)
MAGNESIUM SERPL-MCNC: 2.1 MG/DL — SIGNIFICANT CHANGE UP (ref 1.6–2.6)
MCHC RBC-ENTMCNC: 30 PG — SIGNIFICANT CHANGE UP (ref 27–34)
MCHC RBC-ENTMCNC: 33.1 GM/DL — SIGNIFICANT CHANGE UP (ref 32–36)
MCV RBC AUTO: 90.5 FL — SIGNIFICANT CHANGE UP (ref 80–100)
MONOCYTES # BLD AUTO: 0.64 K/UL — SIGNIFICANT CHANGE UP (ref 0–0.9)
MONOCYTES NFR BLD AUTO: 9.1 % — SIGNIFICANT CHANGE UP (ref 2–14)
NEUTROPHILS # BLD AUTO: 3.97 K/UL — SIGNIFICANT CHANGE UP (ref 1.8–7.4)
NEUTROPHILS NFR BLD AUTO: 56.3 % — SIGNIFICANT CHANGE UP (ref 43–77)
NRBC # BLD: 0 /100 WBCS — SIGNIFICANT CHANGE UP (ref 0–0)
NT-PROBNP SERPL-SCNC: 30 PG/ML — SIGNIFICANT CHANGE UP (ref 0–300)
PLATELET # BLD AUTO: 277 K/UL — SIGNIFICANT CHANGE UP (ref 150–400)
POTASSIUM SERPL-MCNC: 4.9 MMOL/L — SIGNIFICANT CHANGE UP (ref 3.5–5.3)
POTASSIUM SERPL-SCNC: 4.9 MMOL/L — SIGNIFICANT CHANGE UP (ref 3.5–5.3)
PROT SERPL-MCNC: 7.7 G/DL — SIGNIFICANT CHANGE UP (ref 6–8.3)
RBC # BLD: 4.84 M/UL — SIGNIFICANT CHANGE UP (ref 4.2–5.8)
RBC # FLD: 13.3 % — SIGNIFICANT CHANGE UP (ref 10.3–14.5)
SODIUM SERPL-SCNC: 141 MMOL/L — SIGNIFICANT CHANGE UP (ref 135–145)
TROPONIN T, HIGH SENSITIVITY RESULT: 10 NG/L — SIGNIFICANT CHANGE UP (ref 0–51)
WBC # BLD: 7.06 K/UL — SIGNIFICANT CHANGE UP (ref 3.8–10.5)
WBC # FLD AUTO: 7.06 K/UL — SIGNIFICANT CHANGE UP (ref 3.8–10.5)

## 2020-12-01 PROCEDURE — 71046 X-RAY EXAM CHEST 2 VIEWS: CPT | Mod: 26

## 2020-12-01 PROCEDURE — 99285 EMERGENCY DEPT VISIT HI MDM: CPT

## 2020-12-01 PROCEDURE — 93010 ELECTROCARDIOGRAM REPORT: CPT

## 2020-12-01 RX ORDER — ASPIRIN/CALCIUM CARB/MAGNESIUM 324 MG
162 TABLET ORAL ONCE
Refills: 0 | Status: COMPLETED | OUTPATIENT
Start: 2020-12-01 | End: 2020-12-01

## 2020-12-01 RX ORDER — ASPIRIN/CALCIUM CARB/MAGNESIUM 324 MG
81 TABLET ORAL DAILY
Refills: 0 | Status: DISCONTINUED | OUTPATIENT
Start: 2020-12-01 | End: 2020-12-04

## 2020-12-01 RX ADMIN — Medication 162 MILLIGRAM(S): at 22:22

## 2020-12-01 NOTE — ED PROVIDER NOTE - OBJECTIVE STATEMENT
74 y/o M, PMH of HTN, HLD, and prior DVT (no AC), presents to ED c/o "pressure-like", non-radiating, 8/10, CP since 3pm today. Pt states that he had sudden onset of this pain at rest, it lasted approx 30mins, and has been intermittent throughout the day since. Pt endorses associated symptoms of nausea and "belching". Pt denies fevers, chills, SOB, cough, diaphoresis, vomiting, diarrhea, abd pain, urinary sx, leg swelling, or any other symptoms at this time.

## 2020-12-01 NOTE — ED PROVIDER NOTE - PROGRESS NOTE DETAILS
Pt remains asymptomatic. Given history, plan for cdu for cardiac monitoring, serial exams, nuclear stress in AM. Pt agreeable to staying. - Marc Pimentel MD POLI, Kya Sol (Scribe), have documented the rapid assessment note under the dictation of Dr. Kiara Castro, which has been reviewed and affirmed to be accurate.

## 2020-12-01 NOTE — ED PROVIDER NOTE - CLINICAL SUMMARY MEDICAL DECISION MAKING FREE TEXT BOX
76 y/o M, PMH of HTN, HLD, and prior DVT (no AC), presents to ED c/o intermittent, "pressure-like", non-radiating, 8/10, CP a/w nausea.   VSS. Physical exam unremarkable. EKG NSR w/o ST elevations or depressions.  Concern for ACS.  Will check labs, trops, lipase, BNP, CXR. Reassess.

## 2020-12-01 NOTE — ED PROVIDER NOTE - PHYSICAL EXAMINATION
PHYSICAL EXAM:  GENERAL: non-toxic appearing; in no respiratory distress  HEENT: Atraumatic, Normocephalic, PERRL, EOMs intact b/l w/out deficits, no conjunctival pallor, MMM  NECK: No JVD; FROM  CHEST/LUNG: CTAB no wheezes/rhonchi/rales  HEART: RRR no murmur/gallops/rubs  ABDOMEN: +BS, soft, NT, ND  EXTREMITIES: No LE edema, +2 radial pulses b/l, +2 DP/PT pulses b/l  MUSCULOSKELETAL: FROM of all 4 extremities  NERVOUS SYSTEM:  A&Ox3, No motor deficits or sensory deficits; no focal neurologic deficits  SKIN:  No new rashes

## 2020-12-01 NOTE — ED ADULT TRIAGE NOTE - BANDS:
[>50% of Time Spent on Counseling for ____] : Greater than 50% of the encounter time was spent on counseling for [unfilled] [Time Spent: ___ minutes] : I have spent [unfilled] minutes of face to face time with the patient Allergy;

## 2020-12-01 NOTE — ED PROVIDER NOTE - RAPID ASSESSMENT
75y M with PMHx of HTN, Hypercholesterolemia presents to the ED c/o nonradiating CP with nausea since 1830 today. Took solubilized Advil at 2000 today with minimal improvement of symptoms. Denies prior hx of known cardiac disease or smoking hx. NKDA. Denies back pain, F/C, V/D, or SOB. Cardiologist: Dr. Dirk Vallejo.    Dr. Kiara Castro note: The scribe (Kya Sol)'s documentation has been prepared under my direction and personally reviewed by me.  Patient was seen as a tele QDOC patient. The patient will be seen and further worked up in the main emergency department and their care will be completed by the main emergency department team along with a thorough physical exam. Receiving team will follow up on labs, analgesia, any clinical imaging, reassess and disposition as clinically indicated, all decisions regarding the progression of care will be made at their discretion. 75y M with PMHx of HTN, Hypercholesterolemia presents to the ED c/o nonradiating CP with nausea since 1830 today described as an anterior tightness sensation. Took solubilized Advil at 2000 today with minimal improvement of symptoms. Denies prior hx of known cardiac disease or smoking hx. NKDA. Denies back pain or abd pain, F/C, V/D, or SOB. Cardiologist: Dr. Dirk Vallejo.    Dr. Kiara Castro note: The scribe (Kya Sol)'s documentation has been prepared under my direction and personally reviewed by me.  Patient was seen as a tele QDOC patient. The patient will be seen and further worked up in the main emergency department and their care will be completed by the main emergency department team along with a thorough physical exam. Receiving team will follow up on labs, analgesia, any clinical imaging, reassess and disposition as clinically indicated, all decisions regarding the progression of care will be made at their discretion.    I, Dr. Castro, personally performed the service described in the documentation recorded by the scribe in my presence, and it accurately and completely records my words and actions. 75y M with PMHx of HTN, Hypercholesterolemia presents to the ED c/o nonradiating CP with nausea since 1830 today described as an anterior tightness sensation. Noticed increased belching. Took solubilized Advil at 2000 today with minimal improvement of symptoms. Denies prior hx of known cardiac disease or smoking hx. NKDA. Denies back pain or abd pain, F/C, V/D, or SOB. Cardiologist: Dr. Dirk Vallejo.    Dr. Kiara Castro note: The scribe (Kya Sol)'s documentation has been prepared under my direction and personally reviewed by me.  Patient was seen as a tele QDOC patient. The patient will be seen and further worked up in the main emergency department and their care will be completed by the main emergency department team along with a thorough physical exam. Receiving team will follow up on labs, analgesia, any clinical imaging, reassess and disposition as clinically indicated, all decisions regarding the progression of care will be made at their discretion.    I, Dr. Castro, personally performed the service described in the documentation recorded by the scribe in my presence, and it accurately and completely records my words and actions.

## 2020-12-01 NOTE — ED ADULT NURSE NOTE - NSIMPLEMENTINTERV_GEN_ALL_ED
Implemented All Fall Risk Interventions:  [place] to call system. Call bell, personal items and telephone within reach. Instruct patient to call for assistance. Room bathroom lighting operational. Non-slip footwear when patient is off stretcher. Physically safe environment: no spills, clutter or unnecessary equipment. Stretcher in lowest position, wheels locked, appropriate side rails in place. Provide visual cue, wrist band, yellow gown, etc. Monitor gait and stability. Monitor for mental status changes and reorient to person, place, and time. Review medications for side effects contributing to fall risk. Reinforce activity limits and safety measures with patient and family.

## 2020-12-01 NOTE — ED PROVIDER NOTE - ATTENDING CONTRIBUTION TO CARE
75M, pmh htn, hld, dvt previously (not on a/c), presents with chest pain, onset 3 pm today. "pressure-like," non-radiating, non-pleuritic, non-exertional. Pain intermittent, no clear provoking/alleviating factors, a/w nausea, "belching." No shortness of breath, calf pain or swelling. No f/c, n/v/d, abd pain, other complaints.    PE: NAD, NCAT, MMM, Trachea midline, Normal conjunctiva, lungs CTAB, S1/S2 RRR, Normal perfusion, 2+ radial pulses bilat, Abdomen Soft, NTND, No rebound/guarding, No LE edema, No deformity of extremities, No rashes,  No focal motor or sensory deficits.    EKG no acute ischemic changes, however given age, risk factors, concern for acs. Hx, exam does not suggest aortic pathology, PE. Check labs. CXR. Re-negrito - Marc Pimentel MD

## 2020-12-01 NOTE — ED ADULT NURSE NOTE - OBJECTIVE STATEMENT
76 y/o male with a history of HTN and hypercholesterolemia presents to the ED from home c/o chest pain since 3pm. Patient states pain is nonradiating and feels "tight." Pt reports nausea and belching. Denies use of anticoagulants. Denies fever, chills, vomiting, weakness, abd pain, diarrhea/constipation, numbness/tingling, urinary s/s. Patient A&Ox3, in no respiratory distress. Respirations even and unlabored. Chest rise symmetrical. Abdomen soft, nondistended and nontender. Skin warm, dry and intact. Peripheral pulses strong. Pt placed on cardiac monitor, showing NSR. EKG performed in ED. Patient safety provided, call bell within reach and bed in the lowest position.

## 2020-12-01 NOTE — ED PROVIDER NOTE - NS ED ROS FT
Gen: Denies fever, weight loss  HEENT: Denies vision changes, ear pain, epistaxis, sore throat  CV: Denies palpitations; + chest pain  Skin: Denies rash, erythema, color changes  Resp: Denies SOB, cough  Endo: Denies sensitivity to heat, cold, increased urination  GI: Denies abdominal pain, constipation, vomiting, diarrhea; + nausea  Msk: Denies back pain, LE swelling, extremity pain  : Denies dysuria, increased frequency  Neuro: Denies LOC, weakness, numbness, tingling  Psych: Denies hx of psych, hallucinations  ROS statement: all other ROS negative except as per HPI

## 2020-12-01 NOTE — ED ADULT NURSE NOTE - CHPI ED NUR SYMPTOMS NEG
no congestion/no vomiting/no diaphoresis/no shortness of breath/no syncope/no dizziness/no fever/no chills/no back pain

## 2020-12-02 DIAGNOSIS — E78.00 PURE HYPERCHOLESTEROLEMIA, UNSPECIFIED: ICD-10-CM

## 2020-12-02 DIAGNOSIS — R07.9 CHEST PAIN, UNSPECIFIED: ICD-10-CM

## 2020-12-02 DIAGNOSIS — E03.9 HYPOTHYROIDISM, UNSPECIFIED: ICD-10-CM

## 2020-12-02 DIAGNOSIS — I10 ESSENTIAL (PRIMARY) HYPERTENSION: ICD-10-CM

## 2020-12-02 DIAGNOSIS — N40.0 BENIGN PROSTATIC HYPERPLASIA WITHOUT LOWER URINARY TRACT SYMPTOMS: ICD-10-CM

## 2020-12-02 LAB
A1C WITH ESTIMATED AVERAGE GLUCOSE RESULT: 5.5 % — SIGNIFICANT CHANGE UP (ref 4–5.6)
CHOLEST SERPL-MCNC: 153 MG/DL — SIGNIFICANT CHANGE UP
ESTIMATED AVERAGE GLUCOSE: 111 MG/DL — SIGNIFICANT CHANGE UP (ref 68–114)
HDLC SERPL-MCNC: 45 MG/DL — SIGNIFICANT CHANGE UP
LIPID PNL WITH DIRECT LDL SERPL: 86 MG/DL — SIGNIFICANT CHANGE UP
NON HDL CHOLESTEROL: 109 MG/DL — SIGNIFICANT CHANGE UP
SARS-COV-2 RNA SPEC QL NAA+PROBE: SIGNIFICANT CHANGE UP
TRIGL SERPL-MCNC: 116 MG/DL — SIGNIFICANT CHANGE UP
TROPONIN T, HIGH SENSITIVITY RESULT: 13 NG/L — SIGNIFICANT CHANGE UP (ref 0–51)

## 2020-12-02 PROCEDURE — 99223 1ST HOSP IP/OBS HIGH 75: CPT

## 2020-12-02 PROCEDURE — 99222 1ST HOSP IP/OBS MODERATE 55: CPT | Mod: GC

## 2020-12-02 PROCEDURE — 93018 CV STRESS TEST I&R ONLY: CPT

## 2020-12-02 PROCEDURE — 99236 HOSP IP/OBS SAME DATE HI 85: CPT

## 2020-12-02 PROCEDURE — 93016 CV STRESS TEST SUPVJ ONLY: CPT

## 2020-12-02 PROCEDURE — 78452 HT MUSCLE IMAGE SPECT MULT: CPT | Mod: 26

## 2020-12-02 RX ORDER — ENOXAPARIN SODIUM 100 MG/ML
40 INJECTION SUBCUTANEOUS DAILY
Refills: 0 | Status: DISCONTINUED | OUTPATIENT
Start: 2020-12-02 | End: 2020-12-04

## 2020-12-02 RX ORDER — DONEPEZIL HYDROCHLORIDE 10 MG/1
10 TABLET, FILM COATED ORAL AT BEDTIME
Refills: 0 | Status: DISCONTINUED | OUTPATIENT
Start: 2020-12-02 | End: 2020-12-04

## 2020-12-02 RX ORDER — CHOLECALCIFEROL (VITAMIN D3) 125 MCG
1000 CAPSULE ORAL DAILY
Refills: 0 | Status: DISCONTINUED | OUTPATIENT
Start: 2020-12-02 | End: 2020-12-04

## 2020-12-02 RX ORDER — ATORVASTATIN CALCIUM 80 MG/1
40 TABLET, FILM COATED ORAL AT BEDTIME
Refills: 0 | Status: DISCONTINUED | OUTPATIENT
Start: 2020-12-02 | End: 2020-12-04

## 2020-12-02 RX ORDER — DULOXETINE HYDROCHLORIDE 30 MG/1
30 CAPSULE, DELAYED RELEASE ORAL DAILY
Refills: 0 | Status: DISCONTINUED | OUTPATIENT
Start: 2020-12-02 | End: 2020-12-04

## 2020-12-02 RX ORDER — AMLODIPINE BESYLATE 2.5 MG/1
10 TABLET ORAL DAILY
Refills: 0 | Status: DISCONTINUED | OUTPATIENT
Start: 2020-12-02 | End: 2020-12-04

## 2020-12-02 RX ORDER — PANTOPRAZOLE SODIUM 20 MG/1
40 TABLET, DELAYED RELEASE ORAL
Refills: 0 | Status: DISCONTINUED | OUTPATIENT
Start: 2020-12-02 | End: 2020-12-04

## 2020-12-02 RX ORDER — FINASTERIDE 5 MG/1
5 TABLET, FILM COATED ORAL DAILY
Refills: 0 | Status: DISCONTINUED | OUTPATIENT
Start: 2020-12-02 | End: 2020-12-04

## 2020-12-02 RX ORDER — MONTELUKAST 4 MG/1
10 TABLET, CHEWABLE ORAL DAILY
Refills: 0 | Status: DISCONTINUED | OUTPATIENT
Start: 2020-12-02 | End: 2020-12-04

## 2020-12-02 RX ORDER — AMLODIPINE BESYLATE 2.5 MG/1
10 TABLET ORAL ONCE
Refills: 0 | Status: COMPLETED | OUTPATIENT
Start: 2020-12-02 | End: 2020-12-02

## 2020-12-02 RX ORDER — LEVOTHYROXINE SODIUM 125 MCG
50 TABLET ORAL DAILY
Refills: 0 | Status: DISCONTINUED | OUTPATIENT
Start: 2020-12-02 | End: 2020-12-04

## 2020-12-02 RX ORDER — TAMSULOSIN HYDROCHLORIDE 0.4 MG/1
0.4 CAPSULE ORAL AT BEDTIME
Refills: 0 | Status: DISCONTINUED | OUTPATIENT
Start: 2020-12-02 | End: 2020-12-04

## 2020-12-02 RX ADMIN — FINASTERIDE 5 MILLIGRAM(S): 5 TABLET, FILM COATED ORAL at 15:00

## 2020-12-02 RX ADMIN — DONEPEZIL HYDROCHLORIDE 10 MILLIGRAM(S): 10 TABLET, FILM COATED ORAL at 21:03

## 2020-12-02 RX ADMIN — Medication 0.5 MILLIGRAM(S): at 18:09

## 2020-12-02 RX ADMIN — TAMSULOSIN HYDROCHLORIDE 0.4 MILLIGRAM(S): 0.4 CAPSULE ORAL at 21:03

## 2020-12-02 RX ADMIN — Medication 81 MILLIGRAM(S): at 15:00

## 2020-12-02 RX ADMIN — ATORVASTATIN CALCIUM 40 MILLIGRAM(S): 80 TABLET, FILM COATED ORAL at 21:02

## 2020-12-02 RX ADMIN — DULOXETINE HYDROCHLORIDE 30 MILLIGRAM(S): 30 CAPSULE, DELAYED RELEASE ORAL at 15:00

## 2020-12-02 RX ADMIN — AMLODIPINE BESYLATE 10 MILLIGRAM(S): 2.5 TABLET ORAL at 14:58

## 2020-12-02 RX ADMIN — Medication 81 MILLIGRAM(S): at 00:13

## 2020-12-02 NOTE — PATIENT PROFILE ADULT - FALL HARM RISK
Artificial pacemaker    History of appendectomy    History of tonsillectomy    S/P PHOEBE (total abdominal hysterectomy)
other

## 2020-12-02 NOTE — H&P ADULT - NSRESEARCHGRANT_MLMHIDDEN_GEN_A_CORE
History Of Present Illness





<Josie Yung - Last Filed: 02/02/18 21:36>





<Chani Yancey - Last Filed: 02/03/18 00:01>


26 yo female come in for re-evaluation of tina throat, swelling, dysphagia 

gradually developed for past few days. As per pt, was seen here in ED for past 

2 days, taking abx, prednisone with worsening of sx. Otherwise, denies known 

trauma or injury, high fever, drooling, neck pain, dyspnea, CP, SOB,  wheezing, 

abd. pain, V/D, back pian, UTI sx. Ambulate to ED for evaluation, not in any 

apparent distress. (Josie Yung)


History Per: Patient


Onset/Duration Of Symptoms: Gradual





<Josie Yung - Last Filed: 02/02/18 21:36>





<Chani Yancey - Last Filed: 02/03/18 00:01>


Time Seen by Provider: 02/02/18 18:59


Chief Complaint (Nursing): ENT Problem





Past Medical History


Reviewed: Historical Data, Nursing Documentation, Vital Signs





- Medical History


PMH: Asthma


Surgical History: No Surg Hx





- Social History


Hx Tobacco Use: No


Hx Alcohol Use: Yes


Hx Substance Use: No





- Immunization History


Hx Tetanus Toxoid Vaccination: Yes


Hx Influenza Vaccination: No


Hx Pneumococcal Vaccination: No





<Josie Yung - Last Filed: 02/02/18 21:36>


Surgical History: No Surg Hx


Family History: States: No Known Family Hx





- Social History


Hx Tobacco Use: No


Hx Substance Use: No





<Chani Yancey - Last Filed: 02/03/18 00:01>


Vital Signs: 


 Last Vital Signs











Temp  98.1 F   02/02/18 22:14


 


Pulse  77   02/02/18 22:14


 


Resp  18   02/02/18 22:14


 


BP  100/61   02/02/18 22:14


 


Pulse Ox  98   02/02/18 22:14














Review Of Systems


Except As Marked, All Systems Reviewed And Found Negative.


Constitutional: Negative for: Fever, Chills


ENT: Positive for: Mouth Swelling, Throat Pain, Throat Swelling.  Negative for: 

Ear Discharge, Nose Discharge


Cardiovascular: Negative for: Chest Pain


Respiratory: Negative for: Cough, Shortness of Breath, Wheezing


Gastrointestinal: Negative for: Nausea, Vomiting, Abdominal Pain, Diarrhea


Genitourinary: Negative for: Dysuria


Musculoskeletal: Negative for: Neck Pain, Arm Pain, Back Pain


Skin: Negative for: Rash


Neurological: Negative for: Altered Mental Status, Headache, Dizziness





<Josie Yung - Last Filed: 02/02/18 21:36>





Physical Exam





- Physical Exam


Appears: Well, Non-toxic, No Acute Distress


Skin: Normal Color, Warm, Dry, No Rash


Head: Normacephalic


Eye(s): bilateral: PERRL


Ear(s): Bilateral: Normal


Nose: No Flaring, No Discharge


Oral Mucosa: Moist, No Drooling, No Trismus


Tongue: No Swelling


Lips: No Swelling


Throat: No Drooling, Other (Diffuse right sided pharyngeal edema, erythema 

extends to Righ tonsil with enlargement and erythema. No exudate. uvual midline

, no edema.)


Neck: Supple, Other ((-) meningeal sign)


Lymphatic: Adenopathy (Right sided anterior cervical, tender)


Cardiovascular: Rhythm Regular, No Murmur, No JVD


Respiratory: No Decreased Breath Sounds, No Accessory Muscle Use, No Stridor, 

No Wheezing


Gastrointestinal/Abdominal: Soft, No Tenderness, No Distention, No Guarding


Back: Normal Inspection


Extremity: Normal ROM, No Deformity, No Swelling


Neurological/Psych: Oriented x3, Normal Speech, Normal Motor, Normal Sensation, 

Normal Reflexes





<Josie Yung - Last Filed: 02/02/18 21:36>





ED Course And Treatment





- Laboratory Results


Result Diagrams: 


 02/02/18 19:35





 02/02/18 19:35


Lab Interpretation: No Acute Changes


O2 Sat by Pulse Oximetry: 100


Pulse Ox Interpretation: Normal





- CT Scan/US


  ** CT neck w/contrast


Other Rad Studies (CT/US): Radiology Report Reviewed


Progress Note: On re-eval, pt is awake,alert,  not in any apparent distress. 

Fever improved, hemodynamicaly stable.  Non-toxic. Tolerate Po well in ED. No 

evidence of dehydration. PulseOx 100% RA.  ENT: no acute findings.  neck: Supple

, (-) meningeal sign.  Lungs: CTA B/L, BS equal B/L.  CVS: (+)S1S2, reg.  Abd: 

benign. Neurologicaly intact. Blood work review review and appears normal.  Pt 

has clinical findings c/w Influenza-like illness.  Pt advised on course of ds.  

ref. to F/u with PMD in 2-3 days for re-eval.  return if any new changes.





<Josie Yung - Last Filed: 02/02/18 21:36>





- Laboratory Results


Result Diagrams: 


 02/02/18 19:35





 02/02/18 19:35





<Chani Yancey - Last Filed: 02/03/18 00:01>





Medical Decision Making





<Josie Yung - Last Filed: 02/02/18 21:36>





<Chani Yancey - Last Filed: 02/03/18 00:01>


Medical Decision Making: 





Pt with R sided peritonsillar abscess.Case discussed with Dr Behin who wishes 

pt admitted to medicine,suggests decadron,cleomycin.Will consult in AM (Chani Yancey)





Disposition





<Josie Yung - Last Filed: 02/02/18 21:36>





- Disposition


Disposition Time: 00:01





<Chani Yancey - Last Filed: 02/03/18 00:01>





- Disposition


Disposition: HOSPITALIZED


Condition: GOOD


Forms:  [facility] (English)





- Clinical Impression


Clinical Impression: 


 Peritonsillar abscess








Decision To Admit





<Josie Yung - Last Filed: 02/02/18 21:36>





- Pt Status Changed To:


Hospital Disposition Of: Observation





- .


Bed Request Type: Regular


Admitting Physician: Dangelo Blackwell





<Chani Yancey - Last Filed: 02/03/18 00:01>





- .


Patient Diagnosis: 


 Peritonsillar abscess
yes

## 2020-12-02 NOTE — CONSULT NOTE ADULT - SUBJECTIVE AND OBJECTIVE BOX
Patient seen and evaluated at bedside    Chief Complaint: Chest Pain     HPI: 75y M with PMHx of HTN, Hypercholesterolemia presents to the ED with short self resolving episode of nonradiating CP, tightness, non exertional associated with nausea and belching. Has not had similar symptoms in the past, ambulatory at baseline, unlimited ET on flat ground although struggles w/ stairs - has never had similar symptoms in these settings. Troponins negative x 2 (10 and 13) - EKG NSR, early R wave progression otherwise no ischemic changes. Underwent stress testing showing 10 mets of exercise however experienced recurrence of chest pain during rest, 0.5mm STD V4-6, EF 64% w/ inferior/interolateral, basal anteroseptal hypokinesis and multiple defects with partial reversibility as discussed below. On my assessment patient is well appearing and asymptomatic, discussed the potential for LHC and patient is amenable. Per report has cardiologist Dr. Vallejo however would like the patient to be seen by house cardiology.      PMHx:   Hypercholesteremia  Hypothyroid  Essential hypertension    PSHx:   No significant past surgical history    Allergies:  latex (Rash)  No Known Drug Allergies    Current Medications:   aspirin enteric coated 81 milliGRAM(s) Oral daily  DULoxetine 30 milliGRAM(s) Oral daily  finasteride 5 milliGRAM(s) Oral daily  levothyroxine 50 MICROGram(s) Oral daily  pantoprazole    Tablet 40 milliGRAM(s) Oral before breakfast      FAMILY HISTORY:  No pertinent family history in first degree relatives    REVIEW OF SYSTEMS:  CONSTITUTIONAL: No weakness, fevers or chills  EYES/ENT: No visual changes;  No dysphagia  NECK: No pain or stiffness  RESPIRATORY: No cough, wheezing, hemoptysis; No shortness of breath  CARDIOVASCULAR: No palpitations; No lower extremity edema  GASTROINTESTINAL: No abdominal or epigastric pain. No nausea, vomiting, or hematemesis; No diarrhea or constipation. No melena or hematochezia.  BACK: No back pain  GENITOURINARY: No dysuria, frequency or hematuria  NEUROLOGICAL: No numbness or weakness  SKIN: No itching, burning, rashes, or lesions   All other review of systems is negative unless indicated above.    Physical Exam:  T(F): 98 (12-), Max: 98 (12-)  HR: 80 (-) (58 - 80)  BP: 147/94 (12-) (139/100 - 169/93)  RR: 20 (12-02)  SpO2: 99% (12-)  GENERAL: No acute distress, well-developed  HEAD:  Atraumatic, Normocephalic  ENT: EOMI, PERRLA, conjunctiva and sclera clear, Neck supple, No JVD, moist mucosa  CHEST/LUNG: Clear to auscultation bilaterally; No wheeze, equal breath sounds bilaterally   BACK: No spinal tenderness  HEART: Regular rate and rhythm; No murmurs, rubs, or gallops  ABDOMEN: Soft, Nontender, Nondistended; Bowel sounds present  EXTREMITIES:  No clubbing, cyanosis, or edema  PSYCH: Nl behavior, nl affect  NEUROLOGY: AAOx3, non-focal, cranial nerves intact    Cardiovascular Diagnostic Testing:    ECG: NSR, early R wave progression otherwise no ischemic changes    Stress Testing: IMPRESSIONS:Abnormal Study  * Exercise capacity: 10 METS, Excellent for age and  gender.  * Chest Pain: After exercise was completed, patient stated  that he experienced chest pressure about 4/10 in intensity  that began during exercise and abated within 1 minute in  recovery.  * Symptom: Shortness of breath as well as chest pressure  (about 4/10) that started during exercise and abated  immediately during recovery.  * HR Response: Appropriate.  * BP Response: Hypotensive response (decrease in blood  pressure with stress).  * Heart Rhythm: Sinus Rhythm - 69 BPM.  * Q Waves: Possible inferior MI.  * Baseline ECG: Nonspecific ST-T wave abnormality.  * ECG Changes: ECG interpretation limited by excessive  artifact during exercise.  Grossly, there appeared to be  approximately 0.5 mm horizontal ST segment depressions in  leads V4-V6 starting at 6:00 min of exercise at 118 bpm  and persisting at least 5:00 min in recovery.  * Arrhythmia: None.  * Review of raw data shows: Extensive splanchnic uptake  * The left ventricle was normal in size.  * There are medium-sized, mild to moderate defects in the  basal anterior, basal anterolateral, and basal to mid  anteroseptal walls that are partially reversible  suggestive of infarct with moderate tigre-infarct ischemia,  primarily in the basal anterior and basal anterolateral  segments.  * There are medium-sized, moderate defects in the basal  inferior, basal inferoseptal, and basal to mid  inferolateral walls that are mostly fixed suggestive of  infarct with mild tigre-infarct ischemia.  * Post-stress gated wall motion analysis was performed  (LVEF = 64 %;LVEDV = 64 ml.) revealing hypokinesis of the  basal inferior, basal inferoseptal, basal to mid  inferolateral, and basal anteroseptal walls.  *** No previous Nuclear/Stress exam.      Labs: Personally reviewed                        14.5   7.06  )-----------( 277      ( 01 Dec 2020 22:33 )             43.8         141  |  101  |  10  ----------------------------<  116<H>  4.9   |  22  |  0.74    Ca    9.7      01 Dec 2020 22:33  Mg     2.1         TPro  7.7  /  Alb  4.2  /  TBili  0.2  /  DBili  x   /  AST  47<H>  /  ALT  24  /  AlkPhos  91  12      CARDIAC MARKERS ( 02 Dec 2020 00:15 )  13 ng/L / x     / x     / x     / x     / x      CARDIAC MARKERS ( 01 Dec 2020 22:33 )  10 ng/L / x     / x     / x     / x     / x        Serum Pro-Brain Natriuretic Peptide: 30 pg/mL ( @ 22:33)    Total Cholesterol: 153  LDL: --  HDL: 45  T       75y M with PMHx of HTN, Hypercholesterolemia presents to the ED with atypical chest discomfort, negative troponins without obvious signs of ischemia on EKG, underwent stress testing showing likely infarct w/ moderate tigre infarct ischemia, recurrence of symptoms with exertion.     - continue aspirin 81mg   - high intensity statin, atorva 80mg   - monitor on telemetry   - reasonable to obtain TTE   - no indication for emergent cath but given symptoms on stress and lesions with a reasonable degree of reversibility, should likely undergo LHC however should discuss plan with patient's private cardiologist Dr. Vallejo in am   - if recurrent chest pain, electrical or hemodynamic instability please discuss with cardiology    Patient seen and evaluated at bedside    Chief Complaint: Chest Pain     HPI: 75y M with PMHx of HTN & hypercholesterolemia.  Presents to the ED with short self-resolving episode of non-radiating CP/tightness, non-exertional, associated with nausea and belching. Has not had similar symptoms in the past.  He is ambulatory at baseline with unlimited ET on flat ground, although struggles w/ stairs; he has never had similar symptoms in these settings.   Troponins are negative x 2 (10 and 13).  EKG shows NSR, early R wave progression, but otherwise no ischemic changes. Underwent stress testing to 10 mets; he experienced recurrence of chest pain during rest.  EKG showed 0.5 mm ST depressions in V4-6.  Scans show EF 64% w/ inferior/interolateral, basal anteroseptal hypokinesis and multiple defects, with partial reversibility as discussed below.   On my assessment, patient is well appearing and asymptomatic, discussed the potential for LHC and patient is amenable. Per report has private cardiologist, Dr. Vallejo.        PMHx:   Hypercholesteremia  Hypothyroid  Essential hypertension      PSHx:   No significant past surgical history      Allergies:  latex (Rash)  No Known Drug Allergies      Current Medications:   aspirin enteric coated 81 milliGRAM(s) Oral daily  DULoxetine 30 milliGRAM(s) Oral daily  finasteride 5 milliGRAM(s) Oral daily  levothyroxine 50 MICROGram(s) Oral daily  pantoprazole    Tablet 40 milliGRAM(s) Oral before breakfast      FAMILY HISTORY:  No pertinent cardiac history in first degree relatives      REVIEW OF SYSTEMS:  CONSTITUTIONAL: No weakness, fevers or chills  EYES/ENT: No visual changes;  No dysphagia  NECK: No pain or stiffness  RESPIRATORY: No cough, wheezing, hemoptysis; No shortness of breath  CARDIOVASCULAR: No palpitations; No lower extremity edema  GASTROINTESTINAL: No abdominal or epigastric pain. No nausea, vomiting, or hematemesis; No diarrhea or constipation. No melena or hematochezia.  BACK: No back pain  GENITOURINARY: No dysuria, frequency or hematuria  NEUROLOGICAL: No numbness or weakness  SKIN: No itching, burning, rashes, or lesions   All other review of systems is negative unless indicated above.    Physical Exam:  T(F): 98 (12-), Max: 98 (12-)  HR: 80 (-) (58 - 80)  BP: 147/94 (12-02) (139/100 - 169/93)  RR: 20 (12-02)  SpO2: 99% (12-)    GENERAL: No acute distress, well-developed  HEAD:  Atraumatic, Normocephalic  ENT: EOMI, PERRLA, conjunctiva and sclera clear, Neck supple, No JVD, moist mucosa  CHEST/LUNG: Clear to auscultation bilaterally; No wheeze, equal breath sounds bilaterally   BACK: No spinal tenderness  HEART: Regular rate and rhythm; No murmurs, rubs, or gallops  ABDOMEN: Soft, Nontender, Nondistended; Bowel sounds present  EXTREMITIES:  No clubbing, cyanosis, or edema  PSYCH: Nl behavior, nl affect  NEUROLOGY: AAOx3, non-focal, cranial nerves intact      ECG: NSR, early R wave progression otherwise no ischemic changes      Stress Testing:   IMPRESSIONS:Abnormal Study  * Exercise capacity: 10 METS, Excellent for age and gender.  * Chest Pain: After exercise was completed, patient stated that he experienced chest pressure about 4/10 in intensity that began during exercise and abated within 1 minute in recovery.  * Symptom: Shortness of breath as well as chest pressure (about 4/10) that started during exercise and abated immediately during recovery.  * HR Response: Appropriate.  * BP Response: Hypotensive response (decrease in blood pressure with stress).  * Heart Rhythm: Sinus Rhythm - 69 BPM.  * Q Waves: Possible inferior MI.  * Baseline ECG: Nonspecific ST-T wave abnormality.  * ECG Changes: ECG interpretation limited by excessive artifact during exercise.  Grossly, there appeared to be approximately 0.5 mm horizontal ST segment depressions in leads V4-V6 starting at 6:00 min of exercise at 118 bpm and persisting at least 5:00 min in recovery.  * Arrhythmia: None.  * Review of raw data shows: Extensive splanchnic uptake  * The left ventricle was normal in size.  * There are medium-sized, mild to moderate defects in the basal anterior, basal anterolateral, and basal to mid anteroseptal walls that are partially reversible suggestive of infarct with moderate tigre-infarct ischemia, primarily in the basal anterior and basal anterolateral segments.  * There are medium-sized, moderate defects in the basal inferior, basal inferoseptal, and basal to mid inferolateral walls that are mostly fixed suggestive of infarct with mild tigre-infarct ischemia.   * Post-stress gated wall motion analysis was performed (LVEF = 64 %;LVEDV = 64 ml.) revealing hypokinesis of the basal inferior, basal inferoseptal, basal to mid inferolateral, and basal anteroseptal walls.  *** No previous Nuclear/Stress exam.      Labs:                       14.5   7.06  )-----------( 277      ( 01 Dec 2020 22:33 )             43.8       141  |  101  |  10  ----------------------------<  116<H>  4.9   |  22  |  0.74    Ca    9.7      01 Dec 2020 22:33  Mg     2.1         TPro  7.7  /  Alb  4.2  /  TBili  0.2  /  DBili  x   /  AST  47<H>  /  ALT  24  /  AlkPhos  91  12    CARDIAC MARKERS ( 02 Dec 2020 00:15 )  13 ng/L / x     / x     / x     / x     / x      CARDIAC MARKERS ( 01 Dec 2020 22:33 )  10 ng/L / x     / x     / x     / x     / x        Serum Pro-Brain Natriuretic Peptide: 30 pg/mL ( @ 22:33)    Total Cholesterol: 153  LDL: --  HDL: 45  T

## 2020-12-02 NOTE — ED CDU PROVIDER INITIAL DAY NOTE - OBJECTIVE STATEMENT
75y M with PMHx of HTN, Hypercholesterolemia presents to the ED c/o non radiating CP with nausea since 1830 today described as an anterior tightness sensation. Noticed increased belching. Took solubilized Advil at 2000 today with minimal improvement of symptoms. Denies prior hx of known cardiac disease or smoking hx. NKDA. Denies back pain or abd pain, F/C, V/D, or SOB. Cardiologist: Dr. Dirk Vallejo.  In ED, patient had ekg no signs of acute ischemia, troponin 10, chest x ray no signs of acute pathology. Pt sent to CDU for frequent reeval, vitals q 4hrs, telemetry monitoring and nuclear stress.

## 2020-12-02 NOTE — H&P ADULT - ASSESSMENT
75M with PMHx of hypertension, HLD, hypothyroidism and BPH presents to ED with two hour history of left sided chest pain. Patient with baseline ST depressions and reversible defects seen in stress test.

## 2020-12-02 NOTE — H&P ADULT - NSHPPHYSICALEXAM_GEN_ALL_CORE
T(C): 36.3 (12-02-20 @ 19:29), Max: 36.7 (12-01-20 @ 23:18)  HR: 76 (12-02-20 @ 19:29) (58 - 80)  BP: 148/97 (12-02-20 @ 19:29) (139/100 - 169/93)  RR: 18 (12-02-20 @ 19:29) (16 - 20)  SpO2: 93% (12-02-20 @ 19:29) (93% - 99%)    Gen: (fe)male in NAD, appears comfortable, no diaphoresis  HEENT: NCAT, MMM, neck soft and supple  CV: +S1/S2, no m/r/g  Resp: CTAB, no w/r/r  GI: normoactive BS, soft, NTND, no rebounding/guarding  Ext: No LE edema, extremities appear warm and well perfused   Neuro: AOx3, no focal deficits, CNII-XII grossly intact  Psych: No SI/HI/AVH, appropriate affect  Skin: no petechiae, ecchymosis or maculopapular rash noted T(C): 36.3 (12-02-20 @ 19:29), Max: 36.7 (12-01-20 @ 23:18)  HR: 76 (12-02-20 @ 19:29) (58 - 80)  BP: 148/97 (12-02-20 @ 19:29) (139/100 - 169/93)  RR: 18 (12-02-20 @ 19:29) (16 - 20)  SpO2: 93% (12-02-20 @ 19:29) (93% - 99%)    Gen: male in NAD, appears comfortable, no diaphoresis  HEENT: NCAT, MMM, neck soft and supple  CV: +S1/S2, no m/r/g, chest pain not reproducible   Resp: CTAB, no w/r/r  GI: normoactive BS, soft, NTND, no rebounding/guarding  Ext: No LE edema, extremities appear warm and well perfused   Neuro: AOx3, no focal deficits, CNII-XII grossly intact  Psych: No SI/HI/AVH, appropriate affect  Skin: no petechiae, ecchymosis or maculopapular rash noted

## 2020-12-02 NOTE — CONSULT NOTE ADULT - ASSESSMENT
74 yo M with PMHx of HTN & hypercholesterolemia.  Presents to the ED with atypical chest discomfort; has negative troponins without obvious signs of ischemia on EKG.  Stress testing isotope test with CP post exercise, abnl EKG response and scans showing evidence of likely infarct w/ moderate tigre infarct ischemia.       REC:  - continue aspirin 81mg   - high intensity statin, atorvastatin 80mg qd   - monitor on telemetry   - reasonable to obtain TTE   - no indication for emergent cath but given symptoms on stress and lesions with a reasonable degree of reversibility, favor diagnostic LHC   - if recurrent chest pain, electrical or hemodynamic instability please discuss with cardiology       All Cardiology service information can be found 24/7 on [email or web address] - use password: [email or web address] to log in.

## 2020-12-02 NOTE — ED ADULT NURSE REASSESSMENT NOTE - NS ED NURSE REASSESS COMMENT FT1
Pt resting in bed comfortably, currently eating dinner. Denies CP, SOB currently. Appears well. Is ambulating to restroom with steady gait. Awaiting bed assignment.
Pt received from ALLEN Vargas. Pt oriented to CDU & plan of care was discussed. Pt denies any chest pain, SOB, dizziness or palpitations. Pt states he is feeling well at the moment. Safety & comfort measures maintained. Call bell in reach. Will continue to monitor.
07.00 Am Received the Pt from  ALLEN Delgado . Pt is Observed for  CP for nuclear stress test . Received the Pt A&OX 4 obeys commands Nini N/V/D fever chills cp SOB   Comfort care & safety measures continued  IV site looks clean & dry no signs of infiltration noted pt denies  pain IV site .  Pt is advised to call for help  call bell with in the reach pt verbalized the understanding . Continue to monitor

## 2020-12-02 NOTE — ED CDU PROVIDER DISPOSITION NOTE - NSFOLLOWUPINSTRUCTIONS_ED_ALL_ED_FT
1. Follow up with your PMD and Cardiologist, Dr Dirk Vallejo within 48-72 hours.   You may schedule appointment with Cardiology clinic this week by calling [phone]  2. Show copies of your reports given to you. Take all of your other medications as previously prescribed.   3. Worsening or continued chest pain, shortness of breath, weakness, return to ED.

## 2020-12-02 NOTE — H&P ADULT - PROBLEM SELECTOR PLAN 4
-Patient takes Livalo 2 mg at home, his LDL is 86, will switch to Lipitor 40 mg qhs for high intensity therapy

## 2020-12-02 NOTE — H&P ADULT - HISTORY OF PRESENT ILLNESS
75M with PMHx of hypertension, HLD, hypothyroidism and BPH presents to ED with two hour history of left sided chest pain. Patient states that he was sitting down and watching TV when he began to experience left sided chest pain described as somewhat dull with no aggravating or alleviating factors. He endorses nausea, but denies diaphoresis or vomiting. Pain with no radiation. He ate dinner two hours prior. Pain went away on its own, but patient did take NSAIDs. His cardiologist is Dr. Vallejo and per patient last had stress test several years prior which was unremarkable per patient. No known cardiac disease. Patient described the pain as severe when it occurred. Chest pain did not reoccur while here. In ED patient had negative troponins and sent to CDU for stress test. Patient achieved 10 METs, had baseline ST depressions in V4-V6 (0.5 mm) and reversible ischemic defects. Patient seen by cardiology who agree LHC is reasonable to be pursued, but no urgent need for it.

## 2020-12-02 NOTE — ED CDU PROVIDER INITIAL DAY NOTE - PROGRESS NOTE DETAILS
CDU PROGRESS NOTE [place]HEAL [place]: Pt resting comfortably, feeling well without complaint. NAD, VSS. No events on telemetry. Will continue to monitor, Nuclear stress in am. Patient resting in bed comfortably. No distress, no complaints. Vital Signs Stable. No events on telemetry monitor; pending stress. Pt resting comfortably. NAD. No complaints. VSS. pending stress results. stress with multiple areas of tigre-infarct ischemia along with chest pressure during exercise and hypokineses; attempted calling Dr. Vallejo- no call back. Case discussed with Dr. Howell and admission to [facility]. Paged the cards fellow for further intervention.

## 2020-12-02 NOTE — ED CDU PROVIDER INITIAL DAY NOTE - DETAILS
CHEST PAIN  -[facility]  -ROXANNEMI  -Critical access hospital EVAL  -STRESS TEST  -CASE D/W ATTENDING

## 2020-12-02 NOTE — H&P ADULT - PROBLEM SELECTOR PLAN 1
Possibly cardiac in etiology given suspicion stress test.    -Will discuss with Dirk Vallejo, patient's outpatient cardiologist, but inclined to perform LHC on this admission  -Start baby aspirin  -Switch home statin to high dose statin

## 2020-12-02 NOTE — ED CDU PROVIDER DISPOSITION NOTE - ATTENDING CONTRIBUTION TO CARE
CP c exertion, no emergent findings in ED, sent to CDU for stress.  No CP/ectopy in ED or CDU.  Nontoxic appearing.  +reported reproduction of symptoms during stress +ekg changes.  Will admit to tele, monitor in CDU while on tele.  --BMM

## 2020-12-02 NOTE — ED CDU PROVIDER DISPOSITION NOTE - CLINICAL COURSE
75y M with PMHx of HTN, Hypercholesterolemia presents to the ED c/o non radiating CP with nausea since 1830 today described as an anterior tightness sensation. Noticed increased belching. Took solubilized Advil at 2000 today with minimal improvement of symptoms. Denies prior hx of known cardiac disease or smoking hx. NKDA. Denies back pain or abd pain, F/C, V/D, or SOB. Cardiologist: Dr. Dirk Vallejo.  In ED, patient had ekg no signs of acute ischemia, troponin 10, chest x ray no signs of acute pathology. Pt sent to CDU for frequent reeval, vitals q 4hrs, telemetry monitoring and nuclear stress. 75y M with PMHx of HTN, Hypercholesterolemia presents to the ED c/o non radiating CP with nausea since 1830 today described as an anterior tightness sensation. Noticed increased belching. Took solubilized Advil at 2000 today with minimal improvement of symptoms. Denies prior hx of known cardiac disease or smoking hx. NKDA. Denies back pain or abd pain, F/C, V/D, or SOB. Cardiologist: Dr. Dirk Vallejo.  In ED, patient had ekg no signs of acute ischemia, troponin 10, chest x ray no signs of acute pathology. Pt sent to CDU for frequent reeval, vitals q 4hrs, telemetry monitoring and nuclear stress.  stress with multiple areas of tigre-infarct ischemia along with chest pressure during exercise and hypokineses; attempted calling Dr. Vallejo- no call back. Case discussed with Dr. Howell and admission to [facility] hospitalist. Paged the cards fellow for further intervention.

## 2020-12-02 NOTE — H&P ADULT - NSHPREVIEWOFSYSTEMS_GEN_ALL_CORE
Gen: no night sweats or change in appetite  Eyes: no changes in vision or diplopia   ENT: no epistaxis, sinus pain, gingival bleeding, odynophagia or dysphagia  CV: no CP, PND or palpitations  Resp: no cough, wheezing, or hemoptysis  GI: no hematemesis, hematochezia, or melena  : no dysuria, polyuria, or hematuria  MSK: no arthralgias or joint swelling   Neuro: no gross sensory changes, numbness, focal deficits  Psych: no depression or changes in concentration  Heme/Onc: no purpura, petechiae or night sweats  Skin: no pruritus, hair loss or skin lesions  All: no photosensitivity, no complaints of anaphylaxis (SOB, throat swelling) Gen: no night sweats or change in appetite  Eyes: no changes in vision or diplopia   ENT: no epistaxis, sinus pain, gingival bleeding, odynophagia or dysphagia  CV: no PND or palpitations, +CP  Resp: no cough, wheezing, or hemoptysis  GI: no hematemesis, hematochezia, or melena; resolved nausea  : no dysuria, polyuria, or hematuria  MSK: no arthralgias or joint swelling   Neuro: no gross sensory changes, numbness, focal deficits  Psych: no depression or changes in concentration  Heme/Onc: no purpura, petechiae or night sweats  Skin: no pruritus, hair loss or skin lesions  All: no photosensitivity, no complaints of anaphylaxis (SOB, throat swelling)

## 2020-12-03 LAB
ALBUMIN SERPL ELPH-MCNC: 4.1 G/DL — SIGNIFICANT CHANGE UP (ref 3.3–5)
ALP SERPL-CCNC: 83 U/L — SIGNIFICANT CHANGE UP (ref 40–120)
ALT FLD-CCNC: 18 U/L — SIGNIFICANT CHANGE UP (ref 10–45)
ANION GAP SERPL CALC-SCNC: 14 MMOL/L — SIGNIFICANT CHANGE UP (ref 5–17)
AST SERPL-CCNC: 19 U/L — SIGNIFICANT CHANGE UP (ref 10–40)
BILIRUB SERPL-MCNC: 0.2 MG/DL — SIGNIFICANT CHANGE UP (ref 0.2–1.2)
BUN SERPL-MCNC: 11 MG/DL — SIGNIFICANT CHANGE UP (ref 7–23)
CALCIUM SERPL-MCNC: 9.9 MG/DL — SIGNIFICANT CHANGE UP (ref 8.4–10.5)
CHLORIDE SERPL-SCNC: 101 MMOL/L — SIGNIFICANT CHANGE UP (ref 96–108)
CO2 SERPL-SCNC: 23 MMOL/L — SIGNIFICANT CHANGE UP (ref 22–31)
CREAT SERPL-MCNC: 0.76 MG/DL — SIGNIFICANT CHANGE UP (ref 0.5–1.3)
GLUCOSE SERPL-MCNC: 101 MG/DL — HIGH (ref 70–99)
HCT VFR BLD CALC: 41.6 % — SIGNIFICANT CHANGE UP (ref 39–50)
HGB BLD-MCNC: 13.9 G/DL — SIGNIFICANT CHANGE UP (ref 13–17)
MCHC RBC-ENTMCNC: 29.8 PG — SIGNIFICANT CHANGE UP (ref 27–34)
MCHC RBC-ENTMCNC: 33.4 GM/DL — SIGNIFICANT CHANGE UP (ref 32–36)
MCV RBC AUTO: 89.1 FL — SIGNIFICANT CHANGE UP (ref 80–100)
NRBC # BLD: 0 /100 WBCS — SIGNIFICANT CHANGE UP (ref 0–0)
PLATELET # BLD AUTO: 258 K/UL — SIGNIFICANT CHANGE UP (ref 150–400)
POTASSIUM SERPL-MCNC: 3.4 MMOL/L — LOW (ref 3.5–5.3)
POTASSIUM SERPL-SCNC: 3.4 MMOL/L — LOW (ref 3.5–5.3)
PROT SERPL-MCNC: 7 G/DL — SIGNIFICANT CHANGE UP (ref 6–8.3)
RBC # BLD: 4.67 M/UL — SIGNIFICANT CHANGE UP (ref 4.2–5.8)
RBC # FLD: 13.5 % — SIGNIFICANT CHANGE UP (ref 10.3–14.5)
SODIUM SERPL-SCNC: 138 MMOL/L — SIGNIFICANT CHANGE UP (ref 135–145)
WBC # BLD: 5.64 K/UL — SIGNIFICANT CHANGE UP (ref 3.8–10.5)
WBC # FLD AUTO: 5.64 K/UL — SIGNIFICANT CHANGE UP (ref 3.8–10.5)

## 2020-12-03 PROCEDURE — 93458 L HRT ARTERY/VENTRICLE ANGIO: CPT | Mod: 26

## 2020-12-03 PROCEDURE — 99152 MOD SED SAME PHYS/QHP 5/>YRS: CPT

## 2020-12-03 PROCEDURE — 99233 SBSQ HOSP IP/OBS HIGH 50: CPT | Mod: GC

## 2020-12-03 PROCEDURE — 93010 ELECTROCARDIOGRAM REPORT: CPT

## 2020-12-03 RX ORDER — POTASSIUM CHLORIDE 20 MEQ
20 PACKET (EA) ORAL ONCE
Refills: 0 | Status: COMPLETED | OUTPATIENT
Start: 2020-12-03 | End: 2020-12-03

## 2020-12-03 RX ORDER — POTASSIUM CHLORIDE 20 MEQ
40 PACKET (EA) ORAL ONCE
Refills: 0 | Status: COMPLETED | OUTPATIENT
Start: 2020-12-03 | End: 2020-12-03

## 2020-12-03 RX ADMIN — Medication 1000 UNIT(S): at 11:25

## 2020-12-03 RX ADMIN — Medication 20 MILLIEQUIVALENT(S): at 12:18

## 2020-12-03 RX ADMIN — MONTELUKAST 10 MILLIGRAM(S): 4 TABLET, CHEWABLE ORAL at 11:25

## 2020-12-03 RX ADMIN — ATORVASTATIN CALCIUM 40 MILLIGRAM(S): 80 TABLET, FILM COATED ORAL at 21:33

## 2020-12-03 RX ADMIN — DONEPEZIL HYDROCHLORIDE 10 MILLIGRAM(S): 10 TABLET, FILM COATED ORAL at 21:34

## 2020-12-03 RX ADMIN — Medication 10 MILLIGRAM(S): at 07:42

## 2020-12-03 RX ADMIN — PANTOPRAZOLE SODIUM 40 MILLIGRAM(S): 20 TABLET, DELAYED RELEASE ORAL at 05:41

## 2020-12-03 RX ADMIN — Medication 81 MILLIGRAM(S): at 11:25

## 2020-12-03 RX ADMIN — ENOXAPARIN SODIUM 40 MILLIGRAM(S): 100 INJECTION SUBCUTANEOUS at 11:23

## 2020-12-03 RX ADMIN — AMLODIPINE BESYLATE 10 MILLIGRAM(S): 2.5 TABLET ORAL at 05:41

## 2020-12-03 RX ADMIN — DULOXETINE HYDROCHLORIDE 30 MILLIGRAM(S): 30 CAPSULE, DELAYED RELEASE ORAL at 11:25

## 2020-12-03 RX ADMIN — Medication 10 MILLIGRAM(S): at 16:57

## 2020-12-03 RX ADMIN — TAMSULOSIN HYDROCHLORIDE 0.4 MILLIGRAM(S): 0.4 CAPSULE ORAL at 21:33

## 2020-12-03 RX ADMIN — FINASTERIDE 5 MILLIGRAM(S): 5 TABLET, FILM COATED ORAL at 07:42

## 2020-12-03 RX ADMIN — Medication 50 MICROGRAM(S): at 05:41

## 2020-12-03 RX ADMIN — Medication 40 MILLIEQUIVALENT(S): at 11:24

## 2020-12-03 NOTE — ASU PREOP CHECKLIST - NEEDLE GAUGE
I received a page from this patient today around 1510.  I was connected with the patient who reported nausea and vomiting 1-2 hours after taking her prescribed Soriatane 10 mg daily that she started Thursday.  The patient takes the medications at 10 am every day with plenty of food and water.  The patient was not having any other concerning symptoms.  She also reported some chills after the episodes of emesis.  No fevers, chest/neck pain, shortness of breath, abdominal pain, bloody or dark emesis, headaches, vision changes, rashes, ulceration of psoriasis plaques.  I instructed the patient to hold her Soriatane until she speaks with her dermatologist Dr. Barrientos on Monday.  I instructed her to drink plenty of fluids and present to the emergency department with any ongoing concerns.  I notified her dermatologist via secure message.  All questions were answered.    Faraz Ordonez MD  [facility]  
20

## 2020-12-03 NOTE — PROGRESS NOTE ADULT - SUBJECTIVE AND OBJECTIVE BOX
Patient is a 75y old  Male who presents with a chief complaint of Chest Pain (02 Dec 2020 19:57)      INTERVAL HPI/OVERNIGHT EVENTS: noted  pt seen and examined  no cp/sob/      Vital Signs Last 24 Hrs  T(C): 36.6 (03 Dec 2020 13:50), Max: 36.9 (03 Dec 2020 12:20)  T(F): 97.9 (03 Dec 2020 13:50), Max: 98.4 (03 Dec 2020 12:20)  HR: 75 (03 Dec 2020 17:50) (67 - 89)  BP: 132/89 (03 Dec 2020 17:50) (102/80 - 151/93)  BP(mean): --  RR: 18 (03 Dec 2020 17:50) (18 - 20)  SpO2: 98% (03 Dec 2020 17:50) (93% - 99%)    amLODIPine   Tablet 10 milliGRAM(s) Oral daily  aspirin enteric coated 81 milliGRAM(s) Oral daily  atorvastatin 40 milliGRAM(s) Oral at bedtime  cholecalciferol 1000 Unit(s) Oral daily  dicyclomine 10 milliGRAM(s) Oral two times a day before meals  donepezil 10 milliGRAM(s) Oral at bedtime  DULoxetine 30 milliGRAM(s) Oral daily  enoxaparin Injectable 40 milliGRAM(s) SubCutaneous daily  finasteride 5 milliGRAM(s) Oral daily  levothyroxine 50 MICROGram(s) Oral daily  montelukast 10 milliGRAM(s) Oral daily  pantoprazole    Tablet 40 milliGRAM(s) Oral before breakfast  tamsulosin 0.4 milliGRAM(s) Oral at bedtime      PHYSICAL EXAM:  GENERAL: NAD,   EYES: conjunctiva and sclera clear  ENMT: Moist mucous membranes  NECK: Supple, No JVD, Normal thyroid  CHEST/LUNG: non labored, cta b/l  HEART: Regular rate and rhythm; No murmurs, rubs, or gallops  ABDOMEN: Soft, Nontender, Nondistended; Bowel sounds present  EXTREMITIES:  2+ Peripheral Pulses, No clubbing, cyanosis, or edema  LYMPH: No lymphadenopathy noted  SKIN: No rashes or lesions    Consultant(s) Notes Reviewed:  [x ] YES  [ ] NO  Care Discussed with Consultants/Other Providers [ x] YES  [ ] NO    LABS:                        13.9   5.64  )-----------( 258      ( 03 Dec 2020 06:47 )             41.6     12-03    138  |  101  |  11  ----------------------------<  101<H>  3.4<L>   |  23  |  0.76    Ca    9.9      03 Dec 2020 06:47  Mg     2.1     12-01    TPro  7.0  /  Alb  4.1  /  TBili  0.2  /  DBili  x   /  AST  19  /  ALT  18  /  AlkPhos  83  12-03        CAPILLARY BLOOD GLUCOSE                  RADIOLOGY & ADDITIONAL TESTS:    Imaging Personally Reviewed:  [x ] YES  [ ] NO

## 2020-12-03 NOTE — PROGRESS NOTE ADULT - ASSESSMENT
76 yo M with PMHx of HTN & hypercholesterolemia.  Presents to the ED with atypical chest discomfort; has negative troponins without obvious signs of ischemia on EKG.  Stress testing isotope test with CP post exercise, abnl EKG response and scans showing evidence of likely infarct w/ moderate tigre infarct ischemia.       REC:  1.  CP, abnl stress isotope study  - continue aspirin 81mg   - high intensity statin, atorvastatin 80mg qd   - monitor on telemetry   - reasonable to obtain TTE   - Discussed with private cardiologist, Dr. Dirk Vallejo of [facility]; will arrange diagnostic LHC     2.  HLD  - continue statin    3. HTN  - continue amlodipine      Harsha Mayorga M.D.  Cardiology Attending, Consult Service    All Cardiology service information can be found 24/7 on [email or web address], password: [email or web address]

## 2020-12-03 NOTE — PROGRESS NOTE ADULT - SUBJECTIVE AND OBJECTIVE BOX
Alert, no distress.  No cardiac sxs; no CP, palps or dyspnea overnite      Medications:  amLODIPine   Tablet 10 milliGRAM(s) Oral daily  aspirin enteric coated 81 milliGRAM(s) Oral daily  atorvastatin 40 milliGRAM(s) Oral at bedtime  cholecalciferol 1000 Unit(s) Oral daily  dicyclomine 10 milliGRAM(s) Oral two times a day before meals  donepezil 10 milliGRAM(s) Oral at bedtime  DULoxetine 30 milliGRAM(s) Oral daily  enoxaparin Injectable 40 milliGRAM(s) SubCutaneous daily  finasteride 5 milliGRAM(s) Oral daily  levothyroxine 50 MICROGram(s) Oral daily  montelukast 10 milliGRAM(s) Oral daily  pantoprazole    Tablet 40 milliGRAM(s) Oral before breakfast  potassium chloride    Tablet ER 40 milliEquivalent(s) Oral once  potassium chloride    Tablet ER 20 milliEquivalent(s) Oral once  tamsulosin 0.4 milliGRAM(s) Oral at bedtime    PMH/PSH/FH/SH:  Unchanged    ROS:      Vitals:  T(C): 36.8 (20 @ 11:06), Max: 36.8 (20 @ 08:36)  HR: 67 (20 @ 11:06) (67 - 83)  BP: 134/85 (20 @ 11:06) (126/85 - 148/97)  RR: 18 (20 @ 11:06) (18 - 20)  SpO2: 96% (20 @ 11:06) (93% - 99%)  Daily Weight in k.1 (02 Dec 2020 20:54)        GENERAL: No acute distress, well-developed  HEAD:  Atraumatic, Normocephalic  ENT: EOMI, PERRLA, conjunctiva and sclera clear, Neck supple, No JVD, moist mucosa  CHEST/LUNG: Clear to auscultation bilaterally; No wheeze, equal breath sounds bilaterally   BACK: No spinal tenderness  HEART: Regular rate and rhythm; No murmurs, rubs, or gallops  ABDOMEN: Soft, Nontender, Nondistended; Bowel sounds present  EXTREMITIES:  No clubbing, cyanosis, or edema  PSYCH: Nl behavior, nl affect  NEUROLOGY: AAOx3, non-focal, cranial nerves intact      TELE: SR,       LABS:                      13.9   5.64  )-----------( 258      ( 03 Dec 2020 06:47 )             41.6       138  |  101  |  11  ----------------------------<  101<H>  3.4<L>   |  23  |  0.76    Ca    9.9      03 Dec 2020 06:47  Mg     2.1         TPro  7.0  /  Alb  4.1  /  TBili  0.2  /  DBili  x   /  AST  19  /  ALT  18  /  AlkPhos  83      Serum Pro-Brain Natriuretic Peptide: 30 pg/mL (20 @ 22:33)      Stress Testing:   IMPRESSIONS:Abnormal Study  * Exercise capacity: 10 METS, Excellent for age and gender.  * Chest Pain: After exercise was completed, patient stated that he experienced chest pressure about 4/10 in intensity that began during exercise and abated within 1 minute in recovery.  * Symptom: Shortness of breath as well as chest pressure (about 4/10) that started during exercise and abated immediately during recovery.  * HR Response: Appropriate.  * BP Response: Hypotensive response (decrease in blood pressure with stress).  * Heart Rhythm: Sinus Rhythm - 69 BPM.  * Q Waves: Possible inferior MI.  * Baseline ECG: Nonspecific ST-T wave abnormality.  * ECG Changes: ECG interpretation limited by excessive artifact during exercise.  Grossly, there appeared to be approximately 0.5 mm horizontal ST segment depressions in leads V4-V6 starting at 6:00 min of exercise at 118 bpm and persisting at least 5:00 min in recovery.  * Arrhythmia: None.  * Review of raw data shows: Extensive splanchnic uptake  * The left ventricle was normal in size.  * There are medium-sized, mild to moderate defects in the basal anterior, basal anterolateral, and basal to mid anteroseptal walls that are partially reversible suggestive of infarct with moderate tigre-infarct ischemia, primarily in the basal anterior and basal anterolateral segments.  * There are medium-sized, moderate defects in the basal inferior, basal inferoseptal, and basal to mid inferolateral walls that are mostly fixed suggestive of infarct with mild tigre-infarct ischemia.   * Post-stress gated wall motion analysis was performed (LVEF = 64 %;LVEDV = 64 ml.) revealing hypokinesis of the basal inferior, basal inferoseptal, basal to mid inferolateral, and basal anteroseptal walls.  *** No previous Nuclear/Stress exam.

## 2020-12-04 ENCOUNTER — TRANSCRIPTION ENCOUNTER (OUTPATIENT)
Age: 75
End: 2020-12-04

## 2020-12-04 VITALS — DIASTOLIC BLOOD PRESSURE: 86 MMHG | SYSTOLIC BLOOD PRESSURE: 130 MMHG

## 2020-12-04 LAB
ANION GAP SERPL CALC-SCNC: 12 MMOL/L — SIGNIFICANT CHANGE UP (ref 5–17)
BUN SERPL-MCNC: 12 MG/DL — SIGNIFICANT CHANGE UP (ref 7–23)
CALCIUM SERPL-MCNC: 9.9 MG/DL — SIGNIFICANT CHANGE UP (ref 8.4–10.5)
CHLORIDE SERPL-SCNC: 102 MMOL/L — SIGNIFICANT CHANGE UP (ref 96–108)
CO2 SERPL-SCNC: 24 MMOL/L — SIGNIFICANT CHANGE UP (ref 22–31)
CREAT SERPL-MCNC: 0.8 MG/DL — SIGNIFICANT CHANGE UP (ref 0.5–1.3)
GLUCOSE SERPL-MCNC: 111 MG/DL — HIGH (ref 70–99)
HCT VFR BLD CALC: 43.5 % — SIGNIFICANT CHANGE UP (ref 39–50)
HGB BLD-MCNC: 14.9 G/DL — SIGNIFICANT CHANGE UP (ref 13–17)
MCHC RBC-ENTMCNC: 30.5 PG — SIGNIFICANT CHANGE UP (ref 27–34)
MCHC RBC-ENTMCNC: 34.3 GM/DL — SIGNIFICANT CHANGE UP (ref 32–36)
MCV RBC AUTO: 89.1 FL — SIGNIFICANT CHANGE UP (ref 80–100)
NRBC # BLD: 0 /100 WBCS — SIGNIFICANT CHANGE UP (ref 0–0)
PLATELET # BLD AUTO: 274 K/UL — SIGNIFICANT CHANGE UP (ref 150–400)
POTASSIUM SERPL-MCNC: 3.6 MMOL/L — SIGNIFICANT CHANGE UP (ref 3.5–5.3)
POTASSIUM SERPL-SCNC: 3.6 MMOL/L — SIGNIFICANT CHANGE UP (ref 3.5–5.3)
RBC # BLD: 4.88 M/UL — SIGNIFICANT CHANGE UP (ref 4.2–5.8)
RBC # FLD: 13.6 % — SIGNIFICANT CHANGE UP (ref 10.3–14.5)
SODIUM SERPL-SCNC: 138 MMOL/L — SIGNIFICANT CHANGE UP (ref 135–145)
WBC # BLD: 5.96 K/UL — SIGNIFICANT CHANGE UP (ref 3.8–10.5)
WBC # FLD AUTO: 5.96 K/UL — SIGNIFICANT CHANGE UP (ref 3.8–10.5)

## 2020-12-04 PROCEDURE — 78452 HT MUSCLE IMAGE SPECT MULT: CPT

## 2020-12-04 PROCEDURE — 83690 ASSAY OF LIPASE: CPT

## 2020-12-04 PROCEDURE — 84484 ASSAY OF TROPONIN QUANT: CPT

## 2020-12-04 PROCEDURE — U0003: CPT

## 2020-12-04 PROCEDURE — 83880 ASSAY OF NATRIURETIC PEPTIDE: CPT

## 2020-12-04 PROCEDURE — C1887: CPT

## 2020-12-04 PROCEDURE — 93017 CV STRESS TEST TRACING ONLY: CPT

## 2020-12-04 PROCEDURE — C1894: CPT

## 2020-12-04 PROCEDURE — 85025 COMPLETE CBC W/AUTO DIFF WBC: CPT

## 2020-12-04 PROCEDURE — 93005 ELECTROCARDIOGRAM TRACING: CPT

## 2020-12-04 PROCEDURE — 93458 L HRT ARTERY/VENTRICLE ANGIO: CPT

## 2020-12-04 PROCEDURE — 99152 MOD SED SAME PHYS/QHP 5/>YRS: CPT

## 2020-12-04 PROCEDURE — 99232 SBSQ HOSP IP/OBS MODERATE 35: CPT

## 2020-12-04 PROCEDURE — 80061 LIPID PANEL: CPT

## 2020-12-04 PROCEDURE — C1769: CPT

## 2020-12-04 PROCEDURE — 85027 COMPLETE CBC AUTOMATED: CPT

## 2020-12-04 PROCEDURE — 80053 COMPREHEN METABOLIC PANEL: CPT

## 2020-12-04 PROCEDURE — 83036 HEMOGLOBIN GLYCOSYLATED A1C: CPT

## 2020-12-04 PROCEDURE — A9500: CPT

## 2020-12-04 PROCEDURE — 83735 ASSAY OF MAGNESIUM: CPT

## 2020-12-04 PROCEDURE — 71046 X-RAY EXAM CHEST 2 VIEWS: CPT

## 2020-12-04 PROCEDURE — 80048 BASIC METABOLIC PNL TOTAL CA: CPT

## 2020-12-04 RX ORDER — ASPIRIN/CALCIUM CARB/MAGNESIUM 324 MG
1 TABLET ORAL
Qty: 30 | Refills: 0
Start: 2020-12-04 | End: 2021-01-02

## 2020-12-04 RX ORDER — ONDANSETRON 8 MG/1
4 TABLET, FILM COATED ORAL ONCE
Refills: 0 | Status: COMPLETED | OUTPATIENT
Start: 2020-12-04 | End: 2020-12-04

## 2020-12-04 RX ADMIN — Medication 10 MILLIGRAM(S): at 16:51

## 2020-12-04 RX ADMIN — Medication 1000 UNIT(S): at 11:17

## 2020-12-04 RX ADMIN — PANTOPRAZOLE SODIUM 40 MILLIGRAM(S): 20 TABLET, DELAYED RELEASE ORAL at 06:35

## 2020-12-04 RX ADMIN — AMLODIPINE BESYLATE 10 MILLIGRAM(S): 2.5 TABLET ORAL at 06:35

## 2020-12-04 RX ADMIN — FINASTERIDE 5 MILLIGRAM(S): 5 TABLET, FILM COATED ORAL at 11:17

## 2020-12-04 RX ADMIN — ONDANSETRON 4 MILLIGRAM(S): 8 TABLET, FILM COATED ORAL at 12:37

## 2020-12-04 RX ADMIN — Medication 50 MICROGRAM(S): at 06:35

## 2020-12-04 RX ADMIN — ENOXAPARIN SODIUM 40 MILLIGRAM(S): 100 INJECTION SUBCUTANEOUS at 11:18

## 2020-12-04 RX ADMIN — Medication 81 MILLIGRAM(S): at 11:17

## 2020-12-04 RX ADMIN — MONTELUKAST 10 MILLIGRAM(S): 4 TABLET, CHEWABLE ORAL at 11:17

## 2020-12-04 RX ADMIN — Medication 10 MILLIGRAM(S): at 06:35

## 2020-12-04 RX ADMIN — DULOXETINE HYDROCHLORIDE 30 MILLIGRAM(S): 30 CAPSULE, DELAYED RELEASE ORAL at 11:17

## 2020-12-04 NOTE — DISCHARGE NOTE PROVIDER - NSDCCPCAREPLAN_GEN_ALL_CORE_FT
PRINCIPAL DISCHARGE DIAGNOSIS  Diagnosis: Chest pain  Assessment and Plan of Treatment:   HOME CARE INSTRUCTIONS  For the next few days, avoid physical activities that bring on chest pain. Continue physical activities as directed.  Do not smoke.  Avoid drinking alcohol.   Only take over-the-counter or prescription medicine for pain, discomfort, or fever as directed by your caregiver.  Follow your caregiver's suggestions for further testing if your chest pain does not go away.  Keep any follow-up appointments you made. If you do not go to an appointment, you could develop lasting (chronic) problems with pain. If there is any problem keeping an appointment, you must call to reschedule.   SEEK MEDICAL CARE IF:  You think you are having problems from the medicine you are taking. Read your medicine instructions carefully.  Your chest pain does not go away, even after treatment.  You develop a rash with blisters on your chest.  SEEK IMMEDIATE MEDICAL CARE IF:  You have increased chest pain or pain that spreads to your arm, neck, jaw, back, or abdomen.   You develop shortness of breath, an increasing cough, or you are coughing up blood.  You have severe back or abdominal pain, feel nauseous, or vomit.  You develop severe weakness, fainting, or chills.  You have a fever.  THIS IS AN EMERGENCY. Do not wait to see if the pain will go away. Get medical help at once. Call your local emergency services (_____________________). Do not drive yourself to the hospital.        SECONDARY DISCHARGE DIAGNOSES  Diagnosis: Hypercholesteremia  Assessment and Plan of Treatment: Hypercholesteremia    Diagnosis: Hypothyroid  Assessment and Plan of Treatment: you do not make enough thyroid hormone  signs & symptoms of low levels of thyroid hormone - tired, getting cold easily, coarse or thin hair, constipation, shortness of breath, swelling, irregular periods  your doctor will do thyroid hormone blood tests at least once a year to monitor if medication dose is adequate  take your thyroid medicine as directed by your doctor & on empty stomach      Diagnosis: Essential hypertension  Assessment and Plan of Treatment: Low salt diet  Activity as tolerated.  Take all medication as prescribed.  Follow up with your medical doctor for routine blood pressure monitoring at your next visit.  Notify your doctor if you have any of the following symptoms:   Dizziness, Lightheadedness, Blurry vision, Headache, Chest pain, Shortness of breath      Diagnosis: BPH (benign prostatic hyperplasia)  Assessment and Plan of Treatment: You have an enlarged prostate gland which gets bigger as men get older - it is a very common problem and has nothing to do with prostate cancer  Call your doctor if you are urinating more frequently, have trouble starting to urinate, have weak stream, urine leaking or dribbling, and feeling as though bladder is not empty after urination  Your doctor will monitor your prostate with a rectal exam as well as urine or blood testing  You can help yourself by reducing the amount of fluid you drink before going to bed, limiting the amount of alcohol & caffeine you drink   Avoid cold & allergy medication that contain decongestants or antihistamines which make BPH symptoms worse  You can also "double void" by waiting a moment after urinating & trying again  Take your medication as prescribed - one medication helps to relax the muscle around the urethra and the other medication you may take prevents the prostate from growing more or even shrinking the prostate       PRINCIPAL DISCHARGE DIAGNOSIS  Diagnosis: Chest pain  Assessment and Plan of Treatment:   HOME CARE INSTRUCTIONS  For the next few days, avoid physical activities that bring on chest pain. Continue physical activities as directed.  Do not smoke.  Avoid drinking alcohol.   Only take over-the-counter or prescription medicine for pain, discomfort, or fever as directed by your caregiver.  Follow your caregiver's suggestions for further testing if your chest pain does not go away.  Keep any follow-up appointments you made. If you do not go to an appointment, you could develop lasting (chronic) problems with pain. If there is any problem keeping an appointment, you must call to reschedule.   SEEK MEDICAL CARE IF:  You think you are having problems from the medicine you are taking. Read your medicine instructions carefully.  Your chest pain does not go away, even after treatment.  You develop a rash with blisters on your chest.  SEEK IMMEDIATE MEDICAL CARE IF:  You have increased chest pain or pain that spreads to your arm, neck, jaw, back, or abdomen.   You develop shortness of breath, an increasing cough, or you are coughing up blood.  You have severe back or abdominal pain, feel nauseous, or vomit.  You develop severe weakness, fainting, or chills.  You have a fever.  THIS IS AN EMERGENCY. Do not wait to see if the pain will go away. Get medical help at once. Call your local emergency services (_____________________). Do not drive yourself to the hospital.        SECONDARY DISCHARGE DIAGNOSES  Diagnosis: Hypercholesteremia  Assessment and Plan of Treatment: Continue with your cholesterol medications. Eat a heart healthy diet that is low in saturated fats and salt, and includes whole grains, fruits, vegetables and lean protein; exercise regularly (consult with your physician or cardiologist first); maintain a heart healthy weight; if you smoke - quit (A resource to help you stop smoking is the [facility] – phone number [phone].). Continue to follow with your primary physician or cardiologist.  Seek medical help for dizziness, Lightheadedness, Blurry vision, Headache, Chest pain, Shortness of breath      Diagnosis: Hypothyroid  Assessment and Plan of Treatment: you do not make enough thyroid hormone  signs & symptoms of low levels of thyroid hormone - tired, getting cold easily, coarse or thin hair, constipation, shortness of breath, swelling, irregular periods  your doctor will do thyroid hormone blood tests at least once a year to monitor if medication dose is adequate  take your thyroid medicine as directed by your doctor & on empty stomach      Diagnosis: Essential hypertension  Assessment and Plan of Treatment: Low salt diet  Activity as tolerated.  Take all medication as prescribed.  Follow up with your medical doctor for routine blood pressure monitoring at your next visit.  Notify your doctor if you have any of the following symptoms:   Dizziness, Lightheadedness, Blurry vision, Headache, Chest pain, Shortness of breath      Diagnosis: BPH (benign prostatic hyperplasia)  Assessment and Plan of Treatment: You have an enlarged prostate gland which gets bigger as men get older - it is a very common problem and has nothing to do with prostate cancer  Call your doctor if you are urinating more frequently, have trouble starting to urinate, have weak stream, urine leaking or dribbling, and feeling as though bladder is not empty after urination  Your doctor will monitor your prostate with a rectal exam as well as urine or blood testing  You can help yourself by reducing the amount of fluid you drink before going to bed, limiting the amount of alcohol & caffeine you drink   Avoid cold & allergy medication that contain decongestants or antihistamines which make BPH symptoms worse  You can also "double void" by waiting a moment after urinating & trying again  Take your medication as prescribed - one medication helps to relax the muscle around the urethra and the other medication you may take prevents the prostate from growing more or even shrinking the prostate

## 2020-12-04 NOTE — DISCHARGE NOTE PROVIDER - NSDCMRMEDTOKEN_GEN_ALL_CORE_FT
amLODIPine 10 mg oral tablet: 1 tab(s) orally once a day  dicyclomine 10 mg oral capsule: 1 cap(s) orally 2 times a day  donepezil 10 mg oral tablet: 1 tab(s) orally once a day (at bedtime)  DULoxetine 30 mg oral delayed release capsule: 1 cap(s) orally once a day  finasteride 5 mg oral tablet: 1 tab(s) orally once a day  levothyroxine 50 mcg (0.05 mg) oral tablet: 1 tab(s) orally once a day  Livalo 2 mg oral tablet: 1 tab(s) orally once a day  montelukast 10 mg oral tablet: 1 tab(s) orally once a day  omeprazole 20 mg oral delayed release tablet: 1 tab(s) orally once a day  tamsulosin 0.4 mg oral capsule: 1 cap(s) orally once a day  Vitamin D3 1000 intl units (25 mcg) oral tablet: 1 tab(s) orally once a day   amLODIPine 10 mg oral tablet: 1 tab(s) orally once a day  aspirin 81 mg oral delayed release tablet: 1 tab(s) orally once a day  dicyclomine 10 mg oral capsule: 1 cap(s) orally 2 times a day  donepezil 10 mg oral tablet: 1 tab(s) orally once a day (at bedtime)  DULoxetine 30 mg oral delayed release capsule: 1 cap(s) orally once a day  finasteride 5 mg oral tablet: 1 tab(s) orally once a day  levothyroxine 50 mcg (0.05 mg) oral tablet: 1 tab(s) orally once a day  Livalo 2 mg oral tablet: 1 tab(s) orally once a day  montelukast 10 mg oral tablet: 1 tab(s) orally once a day  omeprazole 20 mg oral delayed release tablet: 1 tab(s) orally once a day  tamsulosin 0.4 mg oral capsule: 1 cap(s) orally once a day  Vitamin D3 1000 intl units (25 mcg) oral tablet: 1 tab(s) orally once a day

## 2020-12-04 NOTE — DISCHARGE NOTE NURSING/CASE MANAGEMENT/SOCIAL WORK - PATIENT PORTAL LINK FT
You can access the FollowMyHealth Patient Portal offered by [facility] by registering at the following website: [email or web address]. By joining [facility]’s FollowMyHealth portal, you will also be able to view your health information using other applications (apps) compatible with our system.

## 2020-12-04 NOTE — PROGRESS NOTE ADULT - SUBJECTIVE AND OBJECTIVE BOX
Patient seen and examined at bedside.    Overnight Events: no events, issues or complaints    Review of Systems:  REVIEW OF SYSTEMS:  CONSTITUTIONAL: No weakness, fevers or chills  EYES/ENT: No visual changes;  No dysphagia  NECK: No pain or stiffness  RESPIRATORY: No cough, wheezing, hemoptysis; No shortness of breath  CARDIOVASCULAR: No chest pain or palpitations; No lower extremity edema  GASTROINTESTINAL: No abdominal or epigastric pain. No nausea, vomiting, or hematemesis; No diarrhea or constipation. No melena or hematochezia.  BACK: No back pain  GENITOURINARY: No dysuria, frequency or hematuria  NEUROLOGICAL: No numbness or weakness  SKIN: No itching, burning, rashes, or lesions   All other review of systems is negative unless indicated above.    [x ] All other systems negative  [ ] Unable to assess ROS due to    Current Meds:  amLODIPine   Tablet 10 milliGRAM(s) Oral daily  aspirin enteric coated 81 milliGRAM(s) Oral daily  atorvastatin 40 milliGRAM(s) Oral at bedtime  cholecalciferol 1000 Unit(s) Oral daily  dicyclomine 10 milliGRAM(s) Oral two times a day before meals  donepezil 10 milliGRAM(s) Oral at bedtime  DULoxetine 30 milliGRAM(s) Oral daily  enoxaparin Injectable 40 milliGRAM(s) SubCutaneous daily  finasteride 5 milliGRAM(s) Oral daily  levothyroxine 50 MICROGram(s) Oral daily  montelukast 10 milliGRAM(s) Oral daily  pantoprazole    Tablet 40 milliGRAM(s) Oral before breakfast  tamsulosin 0.4 milliGRAM(s) Oral at bedtime      PAST MEDICAL & SURGICAL HISTORY:  Hypercholesteremia    Hypothyroid    Essential hypertension    No significant past surgical history        Vitals:  T(F): 97.7 (12-04), Max: 98.4 (12-03)  HR: 67 (12-04) (67 - 89)  BP: 112/75 (12-04) (102/80 - 151/93)  RR: 18 (12-04)  SpO2: 93% (12-04)  I&O's Summary    03 Dec 2020 07:01  -  04 Dec 2020 07:00  --------------------------------------------------------  IN: 240 mL / OUT: 0 mL / NET: 240 mL        Physical Exam:  Appearance: No acute distress; well appearing  Eyes: PERRL, EOMI, pink conjunctiva  HENT: Normal oral mucosa  Cardiovascular: RRR, S1, S2, no murmurs, rubs, or gallops; no edema; no JVD  Respiratory: Clear to auscultation bilaterally  Gastrointestinal: soft, non-tender, non-distended with normal bowel sounds  Musculoskeletal: No clubbing; no joint deformity   Neurologic: Non-focal  Lymphatic: No lymphadenopathy  Psychiatry: AAOx3, mood & affect appropriate  Skin: No rashes, ecchymoses, or cyanosis                          14.9   5.96  )-----------( 274      ( 04 Dec 2020 07:14 )             43.5     12-03    138  |  101  |  11  ----------------------------<  101<H>  3.4<L>   |  23  |  0.76    Ca    9.9      03 Dec 2020 06:47    TPro  7.0  /  Alb  4.1  /  TBili  0.2  /  DBili  x   /  AST  19  /  ALT  18  /  AlkPhos  83  12-03      Serum Pro-Brain Natriuretic Peptide: 30 pg/mL (12-01 @ 22:33)              Stress Testing:   IMPRESSIONS:Abnormal Study  * Exercise capacity: 10 METS, Excellent for age and gender.  * Chest Pain: After exercise was completed, patient stated that he experienced chest pressure about 4/10 in intensity that began during exercise and abated within 1 minute in recovery.  * Symptom: Shortness of breath as well as chest pressure (about 4/10) that started during exercise and abated immediately during recovery.  * HR Response: Appropriate.  * BP Response: Hypotensive response (decrease in blood pressure with stress).  * Heart Rhythm: Sinus Rhythm - 69 BPM.  * Q Waves: Possible inferior MI.  * Baseline ECG: Nonspecific ST-T wave abnormality.  * ECG Changes: ECG interpretation limited by excessive artifact during exercise.  Grossly, there appeared to be approximately 0.5 mm horizontal ST segment depressions in leads V4-V6 starting at 6:00 min of exercise at 118 bpm and persisting at least 5:00 min in recovery.  * Arrhythmia: None.  * Review of raw data shows: Extensive splanchnic uptake  * The left ventricle was normal in size.  * There are medium-sized, mild to moderate defects in the basal anterior, basal anterolateral, and basal to mid anteroseptal walls that are partially reversible suggestive of infarct with moderate tigre-infarct ischemia, primarily in the basal anterior and basal anterolateral segments.  * There are medium-sized, moderate defects in the basal inferior, basal inferoseptal, and basal to mid inferolateral walls that are mostly fixed suggestive of infarct with mild tigre-infarct ischemia.   * Post-stress gated wall motion analysis was performed (LVEF = 64 %;LVEDV = 64 ml.) revealing hypokinesis of the basal inferior, basal inferoseptal, basal to mid inferolateral, and basal anteroseptal walls.  *** No previous Nuclear/Stress exam.

## 2020-12-04 NOTE — PROGRESS NOTE ADULT - ASSESSMENT
74 yo M with PMHx of HTN & hypercholesterolemia.  Presents to the ED with atypical chest discomfort; had negative troponins without obvious signs of ischemia on EKG.  Stress testing isotope test with CP post exercise, abnl EKG response and scans showing evidence of likely infarct w/ moderate tigre infarct ischemia.   Cath yesterday did not show obstructive CAD.    REC:  - continue ASA, statin and remainder of home cardiac meds  - Please arrange follow up with primary cardiologist within 7-10 days.  - no further cardiac testing indicated at this time  - At this time cardiology will sign off. Please call back with any questions. Thank you for this consult.     Kev Moreland MD  Cardiology Fellow PGY-4  [facility]    Notes are not final until signed by attending  For all consults and questions:  [email or web address]   Login: [email or web address]

## 2020-12-04 NOTE — DISCHARGE NOTE PROVIDER - HOSPITAL COURSE
76 yo M with PMHx of HTN & hypercholesterolemia.  Presents to the ED with atypical chest discomfort; has negative troponins without obvious signs of ischemia on EKG.  Stress testing isotope test with CP post exercise, abnl EKG response and scans showing evidence of likely infarct w/ moderate tigre infarct ischemia.    76 yo M with PMHx of HTN & hypercholesterolemia. Presents to the ED with atypical chest discomfort; has negative troponin without obvious signs of ischemia on EKG. Stress testing isotope test with CP post exercise, abnl EKG response and scans showing evidence of likely infarct w/ moderate tigre infarct ischemia. Cath yesterday did not show obstructive CAD.  Cards following, and recommended to, continue ASA, statin and remainder of home cardiac meds. Per cards  no further cardiac testing indicated at this time. Pt cleared  for discharge and to follow up with primary cardiologist within 7-10 days.

## 2020-12-04 NOTE — DISCHARGE NOTE PROVIDER - CARE PROVIDER_API CALL
Department of Anesthesiology  Postprocedure Note    Patient: Kailee Lambert  MRN: [id number]  YOB: 1997  Date of evaluation: 6/13/2019  Time:  3:09 PM     Procedure Summary     Date:  06/12/19 Room / Location:      Anesthesia Start:  1709 Anesthesia Stop:  06/13/19 0105    Procedure:  Labor Analgesia Diagnosis:      Scheduled Providers:   Responsible Provider:  Jim Christianson DO    Anesthesia Type:  general, spinal, epidural ASA Status:  3          Anesthesia Type: general, spinal, epidural    Tabby Phase I:      Tabby Phase II:      Last vitals: Reviewed and per EMR flowsheets.        Anesthesia Post Evaluation    Patient location during evaluation: bedside  Patient participation: complete - patient participated  Level of consciousness: awake and alert  Airway patency: patent  Nausea & Vomiting: no nausea and no vomiting  Complications: no  Cardiovascular status: hemodynamically stable  Respiratory status: acceptable  Hydration status: stable Dirk Vallejo (MD)  Cardiovascular Disease; Internal Medicine  [address] [facility] [facility], [address] [place] [address]  Phone: [phone]  Fax: [phone]  Follow Up Time:

## 2021-03-06 NOTE — ED ADULT NURSE NOTE - NURSING NEURO ORIENTATION
oriented to person, place and time There are no Wet Read(s) to document. There is 1 Wet Read(s) to document.

## 2021-04-08 ENCOUNTER — EMERGENCY (EMERGENCY)
Facility: HOSPITAL | Age: 76
LOS: 1 days | Discharge: HOME | End: 2021-04-08
Attending: EMERGENCY MEDICINE
Payer: MEDICARE

## 2021-04-08 VITALS
HEART RATE: 74 BPM | TEMPERATURE: 98 F | SYSTOLIC BLOOD PRESSURE: 172 MMHG | OXYGEN SATURATION: 98 % | RESPIRATION RATE: 18 BRPM | HEIGHT: 67 IN | WEIGHT: 175.05 LBS | DIASTOLIC BLOOD PRESSURE: 106 MMHG

## 2021-04-08 PROBLEM — E78.00 PURE HYPERCHOLESTEROLEMIA, UNSPECIFIED: Chronic | Status: ACTIVE | Noted: 2020-12-01

## 2021-04-08 LAB
ALBUMIN SERPL ELPH-MCNC: 4.3 G/DL — SIGNIFICANT CHANGE UP (ref 3.3–5)
ALP SERPL-CCNC: 90 U/L — SIGNIFICANT CHANGE UP (ref 40–120)
ALT FLD-CCNC: 16 U/L — SIGNIFICANT CHANGE UP (ref 10–45)
ANION GAP SERPL CALC-SCNC: 16 MMOL/L — SIGNIFICANT CHANGE UP (ref 5–17)
APTT BLD: 21.8 SEC — LOW (ref 27.5–35.5)
AST SERPL-CCNC: 26 U/L — SIGNIFICANT CHANGE UP (ref 10–40)
BASOPHILS # BLD AUTO: 0.07 K/UL — SIGNIFICANT CHANGE UP (ref 0–0.2)
BASOPHILS NFR BLD AUTO: 1.1 % — SIGNIFICANT CHANGE UP (ref 0–2)
BILIRUB SERPL-MCNC: 0.4 MG/DL — SIGNIFICANT CHANGE UP (ref 0.2–1.2)
BUN SERPL-MCNC: 10 MG/DL — SIGNIFICANT CHANGE UP (ref 7–23)
CALCIUM SERPL-MCNC: 9.9 MG/DL — SIGNIFICANT CHANGE UP (ref 8.4–10.5)
CHLORIDE SERPL-SCNC: 104 MMOL/L — SIGNIFICANT CHANGE UP (ref 96–108)
CO2 SERPL-SCNC: 20 MMOL/L — LOW (ref 22–31)
CREAT SERPL-MCNC: 0.65 MG/DL — SIGNIFICANT CHANGE UP (ref 0.5–1.3)
EOSINOPHIL # BLD AUTO: 0.16 K/UL — SIGNIFICANT CHANGE UP (ref 0–0.5)
EOSINOPHIL NFR BLD AUTO: 2.4 % — SIGNIFICANT CHANGE UP (ref 0–6)
GLUCOSE SERPL-MCNC: 92 MG/DL — SIGNIFICANT CHANGE UP (ref 70–99)
HCT VFR BLD CALC: 44.4 % — SIGNIFICANT CHANGE UP (ref 39–50)
HGB BLD-MCNC: 15.1 G/DL — SIGNIFICANT CHANGE UP (ref 13–17)
IMM GRANULOCYTES NFR BLD AUTO: 0.2 % — SIGNIFICANT CHANGE UP (ref 0–1.5)
INR BLD: 1.03 RATIO — SIGNIFICANT CHANGE UP (ref 0.88–1.16)
LYMPHOCYTES # BLD AUTO: 2.1 K/UL — SIGNIFICANT CHANGE UP (ref 1–3.3)
LYMPHOCYTES # BLD AUTO: 31.5 % — SIGNIFICANT CHANGE UP (ref 13–44)
MCHC RBC-ENTMCNC: 29.8 PG — SIGNIFICANT CHANGE UP (ref 27–34)
MCHC RBC-ENTMCNC: 34 GM/DL — SIGNIFICANT CHANGE UP (ref 32–36)
MCV RBC AUTO: 87.6 FL — SIGNIFICANT CHANGE UP (ref 80–100)
MONOCYTES # BLD AUTO: 0.61 K/UL — SIGNIFICANT CHANGE UP (ref 0–0.9)
MONOCYTES NFR BLD AUTO: 9.2 % — SIGNIFICANT CHANGE UP (ref 2–14)
NEUTROPHILS # BLD AUTO: 3.71 K/UL — SIGNIFICANT CHANGE UP (ref 1.8–7.4)
NEUTROPHILS NFR BLD AUTO: 55.6 % — SIGNIFICANT CHANGE UP (ref 43–77)
NRBC # BLD: 0 /100 WBCS — SIGNIFICANT CHANGE UP (ref 0–0)
NT-PROBNP SERPL-SCNC: 36 PG/ML — SIGNIFICANT CHANGE UP (ref 0–300)
PLATELET # BLD AUTO: 266 K/UL — SIGNIFICANT CHANGE UP (ref 150–400)
POTASSIUM SERPL-MCNC: 4 MMOL/L — SIGNIFICANT CHANGE UP (ref 3.5–5.3)
POTASSIUM SERPL-SCNC: 4 MMOL/L — SIGNIFICANT CHANGE UP (ref 3.5–5.3)
PROT SERPL-MCNC: 7.7 G/DL — SIGNIFICANT CHANGE UP (ref 6–8.3)
PROTHROM AB SERPL-ACNC: 12.3 SEC — SIGNIFICANT CHANGE UP (ref 10.6–13.6)
RBC # BLD: 5.07 M/UL — SIGNIFICANT CHANGE UP (ref 4.2–5.8)
RBC # FLD: 13 % — SIGNIFICANT CHANGE UP (ref 10.3–14.5)
SARS-COV-2 RNA SPEC QL NAA+PROBE: SIGNIFICANT CHANGE UP
SODIUM SERPL-SCNC: 140 MMOL/L — SIGNIFICANT CHANGE UP (ref 135–145)
TROPONIN T, HIGH SENSITIVITY RESULT: 10 NG/L — SIGNIFICANT CHANGE UP (ref 0–51)
TROPONIN T, HIGH SENSITIVITY RESULT: 10 NG/L — SIGNIFICANT CHANGE UP (ref 0–51)
WBC # BLD: 6.66 K/UL — SIGNIFICANT CHANGE UP (ref 3.8–10.5)
WBC # FLD AUTO: 6.66 K/UL — SIGNIFICANT CHANGE UP (ref 3.8–10.5)

## 2021-04-08 PROCEDURE — 71045 X-RAY EXAM CHEST 1 VIEW: CPT | Mod: 26

## 2021-04-08 PROCEDURE — 99220: CPT

## 2021-04-08 PROCEDURE — 93010 ELECTROCARDIOGRAM REPORT: CPT

## 2021-04-08 RX ORDER — CHOLECALCIFEROL (VITAMIN D3) 125 MCG
1000 CAPSULE ORAL ONCE
Refills: 0 | Status: DISCONTINUED | OUTPATIENT
Start: 2021-04-08 | End: 2021-04-12

## 2021-04-08 RX ORDER — ATORVASTATIN CALCIUM 80 MG/1
10 TABLET, FILM COATED ORAL AT BEDTIME
Refills: 0 | Status: DISCONTINUED | OUTPATIENT
Start: 2021-04-08 | End: 2021-04-12

## 2021-04-08 RX ORDER — FINASTERIDE 5 MG/1
5 TABLET, FILM COATED ORAL DAILY
Refills: 0 | Status: DISCONTINUED | OUTPATIENT
Start: 2021-04-08 | End: 2021-04-12

## 2021-04-08 RX ORDER — TAMSULOSIN HYDROCHLORIDE 0.4 MG/1
0.4 CAPSULE ORAL AT BEDTIME
Refills: 0 | Status: DISCONTINUED | OUTPATIENT
Start: 2021-04-08 | End: 2021-04-12

## 2021-04-08 RX ORDER — LEVOTHYROXINE SODIUM 125 MCG
50 TABLET ORAL DAILY
Refills: 0 | Status: DISCONTINUED | OUTPATIENT
Start: 2021-04-08 | End: 2021-04-12

## 2021-04-08 RX ORDER — ASPIRIN/CALCIUM CARB/MAGNESIUM 324 MG
324 TABLET ORAL ONCE
Refills: 0 | Status: COMPLETED | OUTPATIENT
Start: 2021-04-08 | End: 2021-04-08

## 2021-04-08 RX ORDER — MONTELUKAST 4 MG/1
10 TABLET, CHEWABLE ORAL ONCE
Refills: 0 | Status: COMPLETED | OUTPATIENT
Start: 2021-04-08 | End: 2021-04-09

## 2021-04-08 RX ORDER — AMLODIPINE BESYLATE 2.5 MG/1
10 TABLET ORAL ONCE
Refills: 0 | Status: COMPLETED | OUTPATIENT
Start: 2021-04-08 | End: 2021-04-09

## 2021-04-08 RX ORDER — DULOXETINE HYDROCHLORIDE 30 MG/1
30 CAPSULE, DELAYED RELEASE ORAL DAILY
Refills: 0 | Status: DISCONTINUED | OUTPATIENT
Start: 2021-04-08 | End: 2021-04-12

## 2021-04-08 RX ORDER — PANTOPRAZOLE SODIUM 20 MG/1
40 TABLET, DELAYED RELEASE ORAL
Refills: 0 | Status: DISCONTINUED | OUTPATIENT
Start: 2021-04-08 | End: 2021-04-12

## 2021-04-08 RX ORDER — DONEPEZIL HYDROCHLORIDE 10 MG/1
10 TABLET, FILM COATED ORAL AT BEDTIME
Refills: 0 | Status: DISCONTINUED | OUTPATIENT
Start: 2021-04-08 | End: 2021-04-12

## 2021-04-08 RX ADMIN — Medication 324 MILLIGRAM(S): at 20:37

## 2021-04-08 NOTE — ED CDU PROVIDER INITIAL DAY NOTE - PHYSICAL EXAMINATION
GENERAL: Awake, alert, NAD  HEENT: NC/AT, moist mucous membranes, PERRL, EOMI  LUNGS: CTAB, no wheezes or crackles   CARDIAC: RRR, no m/r/g  ABDOMEN: Soft, normal BS, non tender, non distended, no rebound, no guarding  BACK: No midline spinal tenderness, no CVA tenderness  EXT: No edema, no calf tenderness, 2+ DP pulses bilaterally, no deformities.  NEURO: A&Ox3. Moving all extremities. 5/5 strength UE and LE bilaterally, sensation intact. CN II-XII grossly intact.   SKIN: Warm and dry. No rash.  PSYCH: Normal affect.

## 2021-04-08 NOTE — ED ADULT NURSE REASSESSMENT NOTE - NS ED NURSE REASSESS COMMENT FT1
Received report from ALLEN Salazar. Pt updated on plan, awaiting results. Respirations equal and symmetrical. Pt placed on tele monitor. Call bell within reach.
Pt received from ALLEN Chi. Pt oriented to CDU & plan of care was discussed. Pt A&O x 4. Pt in CDU for tele, lipid/a1c, nuc stress, frequent evals. Pt c/o some SOB with ambulation. Pt denies any chest pain, dizziness or palpitations as of now. Pt on a cardiac monitor in NSR, HR in 60's. V/S stable BP elevated 154/102 will medicate as per MAR, pt afebrile,  IV in place, patent and free of signs of infiltration. Pt resting in bed. Safety & comfort measures maintained. Call bell in reach. Will continue to monitor.

## 2021-04-08 NOTE — ED ADULT NURSE NOTE - OBJECTIVE STATEMENT
76 year old male presents to ED through waiting room from cardiologist complaining of chest pain and hypertension. PMH HLD, hypothyroid, HTN. States chest pain started as a middle chest discomfort 4 days ago while walking outside, partially resolved after sitting down however continued through today, saw cardiologist who found him to be hypertensive and sent in for evaluation. Patient does not remember what his BP was in the office however states he takes BP medication and has been compliant with his medication regiment. Denies headache, shortness of breath, abd pain, nvd, fevers, chills.

## 2021-04-08 NOTE — ED ADULT NURSE NOTE - RESPIRATORY ASSESSMENT
New Patient  New Issue    Referring MD: patient     Work Comp: no   Occupation: Production     Coumadin/Blood Thinners: No    Diabetic: No    Dominant Hand: Right    MRSA Infection: No     - - -

## 2021-04-08 NOTE — ED PROVIDER NOTE - PHYSICAL EXAMINATION
GENERAL: Awake, alert, NAD  HEENT: NC/AT, moist mucous membranes, PERRL, EOMI  LUNGS: CTAB, no wheezes or crackles   CARDIAC: RRR, no m/r/g  ABDOMEN: Soft, normal BS, non tender, non distended, no rebound, no guarding  BACK: No midline spinal tenderness, no CVA tenderness  EXT: No edema, no calf tenderness, 2+ DP pulses bilaterally, no deformities.  NEURO: A&Ox3. Moving all extremities. 5/5 strength UE and LE bilaterally, sensation intact. CN II-XII grossly intact.   SKIN: Warm and dry. No rash.  PSYCH: Normal affect. details…

## 2021-04-08 NOTE — ED PROVIDER NOTE - ATTENDING CONTRIBUTION TO CARE
Attending MD Garcia: I personally have seen and examined this patient.  Resident note reviewed and agree on plan of care and except where noted.  See below for details.     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 Attending MD Garcia: I personally have seen and examined this patient.  Resident note reviewed and agree on plan of care and except where noted.  See below for details.     Seen in [place]  Allergies: Latex  PMD Dr. Cecile Jarvis  Cards Dr. 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  Reports started when first woke up. Reports felt a "weight on my chest", associated dizziness, shortness of breath.  Reports dizziness resolved after 15-20 minutes spontaneously.  Denies diaphoresis.  Denies alleviating or aggravating factors.  Reports chest pressure persisted and it is still present.  Reports feels like "lighter weight" now.  Reports radiation to LUE.  Reports went to see Dr. Dirk Vallejo, reports was found to have elevated blood pressure and was sent in to the ED.  Review of EMR reveals documentation of reported stress in January 2020 when presented with midsternal nonexertional chest pain.  Reports does not remember if he has had a stress test since then.  Reports has frequent abdominal pain where it feels "something is rolling inside", reports minimal at present. Denies  nausea, vomiting, diarrhea, blood in stools, black stools. Denies dysuria, hematuria, change in urinary habits including frequency, urgency, denies inability to urinate.  Denies fevers, chills.  Denies cough, COVID/COVID contacts, sick contacts, recent travel.  Denies LE edema.  A ten (10) point review of systems was negative other than as stated in the HPI or elsewhere in the chart.     Exam:   General: NAD  HENT: head NCAT, airway patent with moist mucous membranes  Eyes: PERRL  Lungs: lungs CTAB with good inspiratory effort, no wheezing, no rhonchi, no rales  Cardiac: +S1S2, no m/r/g  GI: abdomen soft with +BS, NT, ND  : no CVAT  MSK: FROM at neck, no tenderness to midline palpation, no stepoffs along length of spine, no calf tenderness, swelling, erythema or warmth  Neuro: moving all extremities spontaneously, sensory grossly intact, no gross neuro deficits  Psych: normal mood and affect   Skin: no rash    A/P: 76M with chest pain, shortness of breath, associated LUE pain,     TO BE COMPLETED Attending MD Garcia: I personally have seen and examined this patient.  Resident note reviewed and agree on plan of care and except where noted.  See below for details.     Seen in [place] [address]  Allergies: Latex  PMD Dr. Cecile Jarvis  Cards Dr. 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  Reports started when first woke up. Reports felt a "weight on my chest", associated dizziness, shortness of breath.  Reports dizziness resolved after 15-20 minutes spontaneously.  Denies diaphoresis.  Denies alleviating or aggravating factors.  Reports chest pressure persisted and it is still present.  Reports feels like "lighter weight" now.  Reports radiation to LUE.  Reports went to see Dr. Dirk Vallejo, reports was found to have elevated blood pressure and was sent in to the ED.  Review of EMR reveals documentation of reported stress in January 2020 when presented with midsternal nonexertional chest pain.  Reports does not remember if he has had a stress test since then.  Reports has frequent abdominal pain where it feels "something is rolling inside", reports minimal at present. Denies  nausea, vomiting, diarrhea, blood in stools, black stools. Denies dysuria, hematuria, change in urinary habits including frequency, urgency, denies inability to urinate.  Denies fevers, chills.  Denies cough, COVID/COVID contacts, sick contacts, recent travel.  Denies LE edema.  A ten (10) point review of systems was negative other than as stated in the HPI or elsewhere in the chart.     Exam:   General: NAD  HENT: head NCAT, airway patent with moist mucous membranes  Eyes: PERRL  Lungs: lungs CTAB with good inspiratory effort, no wheezing, no rhonchi, no rales  Cardiac: +S1S2, no m/r/g, no LE edema  GI: abdomen soft with +BS, NT, ND  : no CVAT  MSK: FROM at neck, no tenderness to midline palpation, no stepoffs along length of spine, no calf tenderness, swelling, erythema or warmth  Neuro: moving all extremities spontaneously, sensory grossly intact, no gross neuro deficits  Psych: normal mood and affect   Skin: no rash    A/P: 76M with chest pain, shortness of breath, associated LUE pain, DDx includes unstable angina, history and clinical picture not consistent with infectious process but will obtain CXR, COVID swab to evaluate, will obtain labs, EKG, keep on cardiac monitor, will consider obs for stress if results initially nonactionable

## 2021-04-08 NOTE — ED CDU PROVIDER INITIAL DAY NOTE - OBJECTIVE STATEMENT
76 year old male with PMH HTN, HLD, COPD, BPH, hypothyroidism presenting with CP x4 days. CP began while he was walking. Is a substernal pressure, non radiating, non pleuritic, no associated nausea, diaphoresis or weakness. Denies LE edema, history of DVT or PE, SOB. Was evaluated at cardiologist's office today and was referred to ER for high blood pressure. Had a normal stress test 2 months ago. No history of MI. Takes daily aspirin. Denies N/V/D, fevers, abdominal pain, cough, syncope.  cardiologist Dr. Dirk Vallejo  In ED pts workup was nonactionable with troponin 10 x2, CXR without acute findings. pt's chest pain resolved. went to CDU for tele, stress, freq evals

## 2021-04-08 NOTE — ED CDU PROVIDER INITIAL DAY NOTE - DETAILS
76y male p/w substernal chest pressure x4 days worse with exertion:  -tele, lipid/a1c, nuc stress, frequent evals    case d/w Dr. Garcia

## 2021-04-08 NOTE — ED PROVIDER NOTE - CLINICAL SUMMARY MEDICAL DECISION MAKING FREE TEXT BOX
76 year old male with PMH HTN, HLD presenting with CP x4 days. Noted to be hypertensive at OP cardiologist, however ER /88. Afebrile, non tachypneic, non tachycardic. Mentating appropriately. No 76 year old male with PMH HTN, HLD presenting with CP x4 days. Noted to be hypertensive at OP cardiologist, however ER /88. Afebrile, non tachypneic, non tachycardic. Mentating appropriately. 76 year old male with PMH HTN, HLD presenting with CP x4 days. Noted to be hypertensive at OP cardiologist, however ER /88. Afebrile, non tachypneic, non tachycardic. Mentating appropriately. No EKG changes from prior. Will get ACS labs, likely CDU or admission for stress test.

## 2021-04-08 NOTE — ED PROVIDER NOTE - PROGRESS NOTE DETAILS
Louise Del Cid PGY-1: Spoke with Dr. Jarvis. Admits to Dr. Alisson Petersen. Louise Del Cid PGY-1: Spoke with wife. Updated on plan for observation and further testing.

## 2021-04-08 NOTE — ED CDU PROVIDER INITIAL DAY NOTE - ATTENDING CONTRIBUTION TO CARE
Attending MD Garcia:   I personally have seen and examined this patient.  Physician assistant note reviewed and agree on plan of care and except where noted.  See below for details.       Seen in [place]  Allergies: Latex  PMD Dr. Cecile Jarvis  Cards Dr. 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  Reports started when first woke up. Reports felt a "weight on my chest", associated dizziness, shortness of breath.  Reports dizziness resolved after 15-20 minutes spontaneously.  Denies diaphoresis.  Denies alleviating or aggravating factors.  Reports chest pressure persisted and it is still present.  Reports feels like "lighter weight" now.  Reports radiation to LUE.  Reports went to see Dr. Dirk Vallejo, reports was found to have elevated blood pressure and was sent in to the ED.  Review of EMR reveals documentation of reported stress in January 2020 when presented with midsternal nonexertional chest pain.  Reports does not remember if he has had a stress test since then.  Reports has frequent abdominal pain where it feels "something is rolling inside", reports minimal at present. Denies  nausea, vomiting, diarrhea, blood in stools, black stools. Denies dysuria, hematuria, change in urinary habits including frequency, urgency, denies inability to urinate.  Denies fevers, chills.  Denies cough, COVID/COVID contacts, sick contacts, recent travel.  Denies LE edema.  A ten (10) point review of systems was negative other than as stated in the HPI or elsewhere in the chart.     Exam:   General: NAD  HENT: head NCAT, airway patent with moist mucous membranes  Eyes: PERRL  Lungs: lungs CTAB with good inspiratory effort, no wheezing, no rhonchi, no rales  Cardiac: +S1S2, no m/r/g, no LE edema  GI: abdomen soft with +BS, NT, ND  : no CVAT  MSK: FROM at neck, no tenderness to midline palpation, no stepoffs along length of spine, no calf tenderness, swelling, erythema or warmth  Neuro: moving all extremities spontaneously, sensory grossly intact, no gross neuro deficits  Psych: normal mood and affect   Skin: no rash    A/P: 76M with chest pain, shortness of breath, associated LUE pain, plan is for CDU for obs and stress in AM, frequent reassessment, tele monitoring, patient reports amenable

## 2021-04-09 VITALS
OXYGEN SATURATION: 98 % | HEART RATE: 72 BPM | RESPIRATION RATE: 18 BRPM | TEMPERATURE: 98 F | DIASTOLIC BLOOD PRESSURE: 97 MMHG | SYSTOLIC BLOOD PRESSURE: 152 MMHG

## 2021-04-09 LAB
A1C WITH ESTIMATED AVERAGE GLUCOSE RESULT: 5.4 % — SIGNIFICANT CHANGE UP (ref 4–5.6)
CHOLEST SERPL-MCNC: 221 MG/DL — HIGH
ESTIMATED AVERAGE GLUCOSE: 108 MG/DL — SIGNIFICANT CHANGE UP (ref 68–114)
HDLC SERPL-MCNC: 36 MG/DL — LOW
LIPID PNL WITH DIRECT LDL SERPL: 154 MG/DL — HIGH
NON HDL CHOLESTEROL: 185 MG/DL — HIGH
TRIGL SERPL-MCNC: 157 MG/DL — HIGH

## 2021-04-09 PROCEDURE — 71045 X-RAY EXAM CHEST 1 VIEW: CPT

## 2021-04-09 PROCEDURE — 84484 ASSAY OF TROPONIN QUANT: CPT

## 2021-04-09 PROCEDURE — 85610 PROTHROMBIN TIME: CPT

## 2021-04-09 PROCEDURE — 93005 ELECTROCARDIOGRAM TRACING: CPT

## 2021-04-09 PROCEDURE — 99284 EMERGENCY DEPT VISIT MOD MDM: CPT | Mod: 25

## 2021-04-09 PROCEDURE — 80061 LIPID PANEL: CPT

## 2021-04-09 PROCEDURE — 83036 HEMOGLOBIN GLYCOSYLATED A1C: CPT

## 2021-04-09 PROCEDURE — 85025 COMPLETE CBC W/AUTO DIFF WBC: CPT

## 2021-04-09 PROCEDURE — 85730 THROMBOPLASTIN TIME PARTIAL: CPT

## 2021-04-09 PROCEDURE — U0003: CPT

## 2021-04-09 PROCEDURE — 99217: CPT

## 2021-04-09 PROCEDURE — G0378: CPT

## 2021-04-09 PROCEDURE — 80053 COMPREHEN METABOLIC PANEL: CPT

## 2021-04-09 PROCEDURE — 83880 ASSAY OF NATRIURETIC PEPTIDE: CPT

## 2021-04-09 PROCEDURE — U0005: CPT

## 2021-04-09 RX ADMIN — Medication 50 MICROGRAM(S): at 08:00

## 2021-04-09 RX ADMIN — AMLODIPINE BESYLATE 10 MILLIGRAM(S): 2.5 TABLET ORAL at 00:11

## 2021-04-09 RX ADMIN — MONTELUKAST 10 MILLIGRAM(S): 4 TABLET, CHEWABLE ORAL at 00:11

## 2021-04-09 NOTE — ED CDU PROVIDER SUBSEQUENT DAY NOTE - MEDICAL DECISION MAKING DETAILS
Kiara Quevedo MD - Attending Physician: Pt here with CP, now improved. Recent Stress 12/20 abnormal, reported nonobstructive ds on Cath after. Awaiting Cards discussion for dispo planning

## 2021-04-09 NOTE — ED CDU PROVIDER SUBSEQUENT DAY NOTE - HISTORY
CDU PROGRESS NOTE MICHEAL BACON: No interval change. pt resting comfortably without complaint. denies CP. NAD. VSS. no events on tele. Will continue to monitor

## 2021-04-09 NOTE — ED CDU PROVIDER DISPOSITION NOTE - CLINICAL COURSE
76 year old male with PMH HTN, HLD, COPD, BPH, hypothyroidism presenting with CP x4 days. CP began while he was walking. Is a substernal pressure, non radiating, non pleuritic, no associated nausea, diaphoresis or weakness. Denies LE edema, history of DVT or PE, SOB. Was evaluated at cardiologist's office today and was referred to ER for high blood pressure. Had a normal stress test 2 months ago. No history of MI. Takes daily aspirin. Denies N/V/D, fevers, abdominal pain, cough, syncope.  cardiologist Dr. Dirk Vallejo  In ED pts workup was nonactionable with troponin 10 x2, CXR without acute findings. pt's chest pain resolved. went to CDU for tele, stress, salma van  In CDU, 76 year old male with PMH HTN, HLD, COPD, BPH, hypothyroidism presenting with CP x4 days. CP began while he was walking. Is a substernal pressure, non radiating, non pleuritic, no associated nausea, diaphoresis or weakness. Denies LE edema, history of DVT or PE, SOB. Was evaluated at cardiologist's office today and was referred to ER for high blood pressure. Had a normal stress test 2 months ago. No history of MI. Takes daily aspirin. Denies N/V/D, fevers, abdominal pain, cough, syncope.  cardiologist Dr. Dirk Vallejo  In ED pts workup was nonactionable with troponin 10 x2, CXR without acute findings. pt's chest pain resolved. went to CDU for tele, stress, freq evals. pt had stress test and cardiac cath 4 months prior.  spoke to pts cardiologist who will followup with pt as an outpt.

## 2021-04-09 NOTE — ED CDU PROVIDER DISPOSITION NOTE - PATIENT PORTAL LINK FT
You can access the FollowMyHealth Patient Portal offered by [facility] by registering at the following website: [email or web address]. By joining [facility]’s FollowMyHealth portal, you will also be able to view your health information using other applications (apps) compatible with our system.

## 2021-04-09 NOTE — ED CDU PROVIDER DISPOSITION NOTE - NSFOLLOWUPINSTRUCTIONS_ED_ALL_ED_FT
Follow up with your PMD and/or cardiologist within 48-72 hours.   *Bring a copy of all printed lab/test results with you.*    Recommend Aspirin 81mg over the counter daily until further evaluation.   Take all of your other medications as previously prescribed.     Return to the ED for worsening chest pain, shortness of breath, loss of consciousness, or any other concerns. Stay hydrated.    Follow up with your cardiologist Dr. Vallejo on Monday, call for appointment time.      Follow up with your medical doctor within 48-72 hours.     *Bring a copy of all printed lab/test results with you.*    Continue taking all of your home medications as previously prescribed.     Return to the ED for worsening pain, chest pain, shortness of breath, loss of consciousness, weakness, or any other concerns.

## 2021-04-09 NOTE — ED CDU PROVIDER SUBSEQUENT DAY NOTE - PROGRESS NOTE DETAILS
CDU PROGRESS NOTE MICHEAL BACON: pt resting comfortably without complaint. NAD. VSS. No events on tele. pending nuc stress in am. Will continue to monitor. Pt comfortable. No complaints. Vital signs stable. Will continue to observe and reassess. Awaiting stress test. pt wants to go home. will call pts cardiologist to discuss. -Cecy Raymond PA-C spoke to stress lab, pt had an abnormal spoke to stress lab, pt had an abnormal stress test 4 months ago and had a cardiac cath. spoke to pts cardiologist Dr. Dirk Vallejo who says pt can followup with him on monday and does not need further work up in ED/CDU. discussed with Dr. Quevedo, pt stable for d/c home. -Cecy Raymond PA-C

## 2021-04-09 NOTE — ED CDU PROVIDER DISPOSITION NOTE - ATTENDING CONTRIBUTION TO CARE
CDU DISCHARGE NOTE - Dr. Quevedo Attending : I have personally seen and examined this patient. I have discussed the case with the ACP. I have reviewed all pertinent clinical information, including history, physical exam, plan and the ACP’s note and agree except as noted.  Patient is stable for discharge to home.  Labs and tests reviewed with the patient.  The patient is to follow up with their doctor as discussed.

## 2021-04-15 NOTE — ED PROVIDER NOTE - GASTROINTESTINAL NEGATIVE STATEMENT, MLM
no abdominal pain, no bloating, no constipation, no diarrhea, no nausea and no vomiting. Spironolactone Pregnancy And Lactation Text: This medication can cause feminization of the male fetus and should be avoided during pregnancy. The active metabolite is also found in breast milk.

## 2021-04-30 DIAGNOSIS — Z01.818 ENCOUNTER FOR OTHER PREPROCEDURAL EXAMINATION: ICD-10-CM

## 2021-05-03 ENCOUNTER — APPOINTMENT (OUTPATIENT)
Dept: DISASTER EMERGENCY | Facility: CLINIC | Age: 76
End: 2021-05-03

## 2021-05-04 LAB — SARS-COV-2 N GENE NPH QL NAA+PROBE: NOT DETECTED

## 2021-05-06 ENCOUNTER — OUTPATIENT (OUTPATIENT)
Dept: OUTPATIENT SERVICES | Facility: HOSPITAL | Age: 76
LOS: 1 days | End: 2021-05-06
Payer: MEDICARE

## 2021-05-06 ENCOUNTER — RESULT REVIEW (OUTPATIENT)
Age: 76
End: 2021-05-06

## 2021-05-06 VITALS
OXYGEN SATURATION: 98 % | RESPIRATION RATE: 17 BRPM | HEART RATE: 84 BPM | DIASTOLIC BLOOD PRESSURE: 73 MMHG | SYSTOLIC BLOOD PRESSURE: 132 MMHG

## 2021-05-06 VITALS
DIASTOLIC BLOOD PRESSURE: 84 MMHG | HEIGHT: 67 IN | WEIGHT: 179.9 LBS | OXYGEN SATURATION: 100 % | TEMPERATURE: 98 F | SYSTOLIC BLOOD PRESSURE: 133 MMHG | RESPIRATION RATE: 15 BRPM | HEART RATE: 88 BPM

## 2021-05-06 DIAGNOSIS — Z12.11 ENCOUNTER FOR SCREENING FOR MALIGNANT NEOPLASM OF COLON: ICD-10-CM

## 2021-05-06 DIAGNOSIS — Q55.29 OTHER CONGENITAL MALFORMATIONS OF TESTIS AND SCROTUM: Chronic | ICD-10-CM

## 2021-05-06 PROCEDURE — 43239 EGD BIOPSY SINGLE/MULTIPLE: CPT

## 2021-05-06 PROCEDURE — 45380 COLONOSCOPY AND BIOPSY: CPT | Mod: XS

## 2021-05-06 PROCEDURE — 45385 COLONOSCOPY W/LESION REMOVAL: CPT

## 2021-05-06 PROCEDURE — 88305 TISSUE EXAM BY PATHOLOGIST: CPT

## 2021-05-06 PROCEDURE — 88305 TISSUE EXAM BY PATHOLOGIST: CPT | Mod: 26

## 2021-05-06 RX ORDER — AMLODIPINE BESYLATE 2.5 MG/1
1 TABLET ORAL
Qty: 0 | Refills: 0 | DISCHARGE

## 2021-05-06 RX ORDER — MONTELUKAST 4 MG/1
1 TABLET, CHEWABLE ORAL
Qty: 0 | Refills: 0 | DISCHARGE

## 2021-05-06 RX ORDER — CHOLECALCIFEROL (VITAMIN D3) 125 MCG
1 CAPSULE ORAL
Qty: 0 | Refills: 0 | DISCHARGE

## 2021-05-06 RX ORDER — LEVOTHYROXINE SODIUM 125 MCG
1 TABLET ORAL
Qty: 0 | Refills: 0 | DISCHARGE

## 2021-05-06 RX ORDER — TAMSULOSIN HYDROCHLORIDE 0.4 MG/1
1 CAPSULE ORAL
Qty: 0 | Refills: 0 | DISCHARGE

## 2021-05-06 RX ORDER — FINASTERIDE 5 MG/1
1 TABLET, FILM COATED ORAL
Qty: 0 | Refills: 0 | DISCHARGE

## 2021-05-06 RX ORDER — DULOXETINE HYDROCHLORIDE 30 MG/1
1 CAPSULE, DELAYED RELEASE ORAL
Qty: 0 | Refills: 0 | DISCHARGE

## 2021-05-06 RX ORDER — PITAVASTATIN CALCIUM 1.04 MG/1
1 TABLET, FILM COATED ORAL
Qty: 0 | Refills: 0 | DISCHARGE

## 2021-05-06 RX ORDER — DONEPEZIL HYDROCHLORIDE 10 MG/1
1 TABLET, FILM COATED ORAL
Qty: 0 | Refills: 0 | DISCHARGE

## 2021-05-06 RX ORDER — OMEPRAZOLE 10 MG/1
1 CAPSULE, DELAYED RELEASE ORAL
Qty: 0 | Refills: 0 | DISCHARGE

## 2021-05-06 NOTE — PRE-ANESTHESIA EVALUATION ADULT - TEMPERATURE IN CELSIUS (DEGREES C)
What side effects? ?     He had side effects to pravachol and lipitor before, so not many options left, we can try Zetia ( which is NOT a statin ), !! 36.8

## 2021-05-06 NOTE — PRE PROCEDURE NOTE - PRE PROCEDURE EVALUATION
Attending Physician:              Montana Knight M.D.              Procedure: EGD/colonoscopy    Indication for Procedure: abdominal pain; + cologuard  ________________________________________________________  PAST MEDICAL & SURGICAL HISTORY:  Essential hypertension    Hypothyroid    Hypercholesteremia    Anomaly of testicle      ALLERGIES:  latex (Rash)  No Known Drug Allergies    HOME MEDICATIONS:  amLODIPine 10 mg oral tablet: 1 tab(s) orally once a day  dicyclomine 10 mg oral capsule: 1 cap(s) orally 2 times a day  donepezil 10 mg oral tablet: 1 tab(s) orally once a day (at bedtime)  DULoxetine 30 mg oral delayed release capsule: 1 cap(s) orally once a day  finasteride 5 mg oral tablet: 1 tab(s) orally once a day  levothyroxine 50 mcg (0.05 mg) oral tablet: 1 tab(s) orally once a day  Livalo 2 mg oral tablet: 1 tab(s) orally once a day  montelukast 10 mg oral tablet: 1 tab(s) orally once a day  omeprazole 20 mg oral delayed release tablet: 1 tab(s) orally once a day  tamsulosin 0.4 mg oral capsule: 1 cap(s) orally once a day  Vitamin D3 1000 intl units (25 mcg) oral tablet: 1 tab(s) orally once a day    AICD/PPM: [ ] yes   [ x] no    PERTINENT LAB DATA:                      PHYSICAL EXAMINATION:  see RN note  T(C): --  HR: --  BP: --  RR: --  SpO2: --    Constitutional: NAD  HEENT: PERRLA, EOMI,    Neck:  No JVD  Respiratory: CTAB/L  Cardiovascular: S1 and S2  Gastrointestinal: BS+, soft, NT/ND  Extremities: No peripheral edema  Neurological: A/O x 3, no focal deficits  Psychiatric: Normal mood, normal affect  Skin: No rashes    ASA Class: I [ ]  II [ ]  III [x ]  IV [ ]    COMMENTS:    The patient is a suitable candidate for the planned procedure unless box checked [ ]  No, explain:

## 2021-05-06 NOTE — PRE-ANESTHESIA EVALUATION ADULT - NSANTHPMHFT_GEN_ALL_CORE
GERD, TIA, Prostate CA, Asthma/COPD, DEONTE, Col Pulmonale  s/p Prostate Sx GERD, TIA, Prostate CA, Asthma/COPD, DEONTE, Col Pulmonale, anxiety  s/p Prostate Sx

## 2021-05-10 LAB — SURGICAL PATHOLOGY STUDY: SIGNIFICANT CHANGE UP

## 2021-06-11 ENCOUNTER — EMERGENCY (EMERGENCY)
Facility: HOSPITAL | Age: 76
LOS: 1 days | Discharge: LEFT BEFORE DISCHARGE | End: 2021-06-11
Payer: MEDICARE

## 2021-06-11 VITALS
SYSTOLIC BLOOD PRESSURE: 154 MMHG | HEIGHT: 67 IN | TEMPERATURE: 98 F | WEIGHT: 158.07 LBS | OXYGEN SATURATION: 96 % | HEART RATE: 62 BPM | RESPIRATION RATE: 18 BRPM | DIASTOLIC BLOOD PRESSURE: 108 MMHG

## 2021-06-11 DIAGNOSIS — Q55.29 OTHER CONGENITAL MALFORMATIONS OF TESTIS AND SCROTUM: Chronic | ICD-10-CM

## 2021-06-11 PROCEDURE — 99283 EMERGENCY DEPT VISIT LOW MDM: CPT

## 2021-06-11 PROCEDURE — L9991: CPT

## 2021-06-11 PROCEDURE — 93005 ELECTROCARDIOGRAM TRACING: CPT

## 2021-06-11 NOTE — ED PROCEDURE NOTE - PROCEDURE ADDITIONAL DETAILS
Detail Level: Detailed
Site was identified, anesthetized, 1.5 cm incision with expression of bloody purulent fluid. Loculations broken with forceps, further expression of fluid. Additional area of fluctuance distal to incision, the incision was extended another 1 cm with expression of more purulent bloody drainage.
Detail Level: Simple
Moisturizer Recommendations: Hydroboost
Detail Level: Zone

## 2021-06-11 NOTE — ED ADULT TRIAGE NOTE - EXPLANATION OF PATIENT'S REASON FOR LEAVING
pt wife states "he feels better and gets like this sometimes when he gets very nervous. We're ok to leave." pt instructed to come back if symptoms persist.

## 2022-01-19 ENCOUNTER — TRANSCRIPTION ENCOUNTER (OUTPATIENT)
Age: 77
End: 2022-01-19

## 2022-01-26 NOTE — ED ADULT NURSE REASSESSMENT NOTE - NS ED NURSE REASSESS COMMENT FT1
Home sleep apnea test not returned. Was contacted to let her know it needs to be returned or will be reported stolen. Voicemail was left 1/26/22 4530.   Received report from Melvi/Valarie VILLA. Pt. A&Ox3, sitting up in stretcher, skin pink, warm and dry. Pt. does not appear to be in any acute distress - nonlabored breathing noted and pt. is speaking in full coherent sentences. Pt. does not have any complaints at this time. Pending CT results. VSS/NAD. Pt. aware of plan of care. Safety and comfort provided. Will cont. to monitor.

## 2022-08-26 NOTE — ED ADULT NURSE NOTE - NS ED NURSE LEVEL OF CONSCIOUSNESS AFFECT
2nd call to patient, LM for call back.  
Call # 3.    If no returned call please send letter.  
Per patient unable to afford medication until after his [birth year] on [birth year] as he will then be on Medicare.  Patient is scheduled with Kary ROMERO on 10/11/22 for follow up.  
Refill request received for Verapamil. As noted on last refill sent on 6/2/22, reminded of need for office appt that is due.       Last office visit: 10/21/2021 (recommended for 3 month follow up)     Next scheduled: OVERDUE. Letter sent 2/12/2022 with request for scheduling 3 month follow up visit (along with echo and Holter).          Left detailed message on self identified voicemail relaying of need to talk with him in regards to scheduling follow up before sending medication refills over. Was recommended for 24 hour Holter and echo prior to appt as well.     Note: if patient not agreeable to scheduling follow up visit, then will forward refill request to PCP.     
Scheduled 10/11/22, will refill until that time.   
Calm/Appropriate

## 2022-09-02 NOTE — ED PROVIDER NOTE - OBJECTIVE STATEMENT
0
75 year old male with ho htn, hld pw constant, nonexertional midsternal chest pressure since this morning non-radiating, no associated shortness of breath, diaphoresis, n/v

## 2023-05-26 NOTE — ED PROVIDER NOTE - ATTENDING CONTRIBUTION TO CARE
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 Symptoms started in December, have been waxing and waning.   Plan: prednisoLONE acetate (PRED FORTE) 1 % ophthalmic suspension        Begin Pred Forte four times daily in left eye. Follow up in 1 week for improvement and consideration of taper. Ok to continue using Systane as needed for dryness. Recommend staying out of contact lenses for now.       Complete documentation of historical and exam elements from today's encounter can  be found in the full encounter summary report (not reduplicated in this progress  note). I personally obtained the chief complaint(s) and history of present illness. I  confirmed and edited as necessary the review of systems, past medical/surgical  history, family history, social history, and examination findings as documented by  others; and I examined the patient myself. I personally reviewed the relevant tests,  images, and reports as documented above. I formulated and edited as necessary the  assessment and plan and discussed the findings and management plan with the  patient and family.    Brian Griffith, OD   
Dr. Bledsoe (Attending Physician)  I performed a history and physical exam of the patient and discussed their management with the resident. I reviewed the resident's note and agree with the documented findings and plan of care. My medical decision making and observations are found above.

## 2024-03-11 NOTE — ED ADULT NURSE NOTE - CHIEF COMPLAINT
Thank you for choosing us for your care. I think an in-clinic visit or urgent care would be best next steps based on your symptoms. Please schedule a clinic appointment; you won t be charged for this eVisit.      You can schedule an appointment right here in [facility], or call [phone]        Since Lia Berrios no longer works here, we are unable to do e-visits with her.    Good Ch PA-C on 3/11/2024 at 7:19 AM (covering for Lia Berrios)    
The patient is a 75y Male complaining of chest pressure.

## 2024-11-21 NOTE — PRE-ANESTHESIA EVALUATION ADULT - MALLAMPATI CLASS
Class III - visualization of the soft palate and the base of the uvula Refer to the Assessment tab to view/cancel completed assessment.

## 2025-01-22 NOTE — ED ADULT TRIAGE NOTE - AS HEIGHT TYPE
stated Go for blood tests as directed. Your doctor will do lab tests at regular visits to monitor the effects of this medicine. Please follow up with your doctor and keep your health care provider appointments.

## 2025-03-16 NOTE — ED ADULT NURSE NOTE - CHIEF COMPLAINT
The patient is a 73y Male complaining of nose bleed.
Care of the patient is governed by the Department of Anesthesia policy and procedure manual.